# Patient Record
Sex: MALE | Race: BLACK OR AFRICAN AMERICAN | NOT HISPANIC OR LATINO | URBAN - METROPOLITAN AREA
[De-identification: names, ages, dates, MRNs, and addresses within clinical notes are randomized per-mention and may not be internally consistent; named-entity substitution may affect disease eponyms.]

---

## 2018-05-29 ENCOUNTER — OUTPATIENT (OUTPATIENT)
Dept: OUTPATIENT SERVICES | Facility: HOSPITAL | Age: 83
LOS: 1 days | End: 2018-05-29
Payer: MEDICARE

## 2018-05-29 DIAGNOSIS — R06.09 OTHER FORMS OF DYSPNEA: ICD-10-CM

## 2018-05-29 PROCEDURE — 78452 HT MUSCLE IMAGE SPECT MULT: CPT

## 2018-05-29 PROCEDURE — A9502: CPT

## 2018-05-29 PROCEDURE — 93017 CV STRESS TEST TRACING ONLY: CPT

## 2018-06-21 ENCOUNTER — OUTPATIENT (OUTPATIENT)
Dept: OUTPATIENT SERVICES | Facility: HOSPITAL | Age: 83
LOS: 1 days | End: 2018-06-21
Payer: MEDICARE

## 2018-06-21 DIAGNOSIS — I35.9 NONRHEUMATIC AORTIC VALVE DISORDER, UNSPECIFIED: ICD-10-CM

## 2018-06-21 PROCEDURE — 93320 DOPPLER ECHO COMPLETE: CPT | Mod: 26

## 2018-06-21 PROCEDURE — 93312 ECHO TRANSESOPHAGEAL: CPT | Mod: 26

## 2018-06-21 PROCEDURE — 93312 ECHO TRANSESOPHAGEAL: CPT

## 2018-06-21 PROCEDURE — 93325 DOPPLER ECHO COLOR FLOW MAPG: CPT | Mod: 26

## 2018-06-21 PROCEDURE — 93320 DOPPLER ECHO COMPLETE: CPT

## 2018-06-21 PROCEDURE — 82962 GLUCOSE BLOOD TEST: CPT

## 2018-06-21 PROCEDURE — 93325 DOPPLER ECHO COLOR FLOW MAPG: CPT

## 2019-04-15 PROBLEM — Z00.00 ENCOUNTER FOR PREVENTIVE HEALTH EXAMINATION: Status: ACTIVE | Noted: 2019-04-15

## 2019-04-22 ENCOUNTER — APPOINTMENT (OUTPATIENT)
Dept: UROLOGY | Facility: CLINIC | Age: 84
End: 2019-04-22

## 2019-06-12 ENCOUNTER — APPOINTMENT (OUTPATIENT)
Dept: UROLOGY | Facility: CLINIC | Age: 84
End: 2019-06-12
Payer: MEDICARE

## 2019-06-12 VITALS
WEIGHT: 277 LBS | DIASTOLIC BLOOD PRESSURE: 66 MMHG | HEART RATE: 95 BPM | SYSTOLIC BLOOD PRESSURE: 124 MMHG | BODY MASS INDEX: 41.03 KG/M2 | HEIGHT: 69 IN | RESPIRATION RATE: 18 BRPM

## 2019-06-12 DIAGNOSIS — R97.20 ELEVATED PROSTATE, SPECIFIC ANTIGEN [PSA]: ICD-10-CM

## 2019-06-12 DIAGNOSIS — Z86.39 PERSONAL HISTORY OF OTHER ENDOCRINE, NUTRITIONAL AND METABOLIC DISEASE: ICD-10-CM

## 2019-06-12 DIAGNOSIS — Z87.891 PERSONAL HISTORY OF NICOTINE DEPENDENCE: ICD-10-CM

## 2019-06-12 DIAGNOSIS — Z86.79 PERSONAL HISTORY OF OTHER DISEASES OF THE CIRCULATORY SYSTEM: ICD-10-CM

## 2019-06-12 PROCEDURE — 99204 OFFICE O/P NEW MOD 45 MIN: CPT

## 2019-06-26 PROBLEM — R97.20 ELEVATED PSA: Status: ACTIVE | Noted: 2019-06-26

## 2019-07-22 PROBLEM — Z87.891 FORMER SMOKER: Status: ACTIVE | Noted: 2019-07-22

## 2019-07-22 PROBLEM — Z86.79 HISTORY OF HYPERTENSION: Status: RESOLVED | Noted: 2019-07-22 | Resolved: 2019-07-22

## 2019-07-22 PROBLEM — Z86.39 HISTORY OF DIABETES MELLITUS: Status: RESOLVED | Noted: 2019-07-22 | Resolved: 2019-07-22

## 2019-07-22 NOTE — PHYSICAL EXAM
[General Appearance - Well Nourished] : well nourished [General Appearance - Well Developed] : well developed [Normal Appearance] : normal appearance [Well Groomed] : well groomed [General Appearance - In No Acute Distress] : no acute distress [Edema] : no peripheral edema [Respiration, Rhythm And Depth] : normal respiratory rhythm and effort [Exaggerated Use Of Accessory Muscles For Inspiration] : no accessory muscle use [Abdomen Soft] : soft [Abdomen Tenderness] : non-tender [Costovertebral Angle Tenderness] : no ~M costovertebral angle tenderness [Urethral Meatus] : meatus normal [Urinary Bladder Findings] : the bladder was normal on palpation [Scrotum] : the scrotum was normal [Testes Mass (___cm)] : there were no testicular masses [No Prostate Nodules] : no prostate nodules [Normal Station and Gait] : the gait and station were normal for the patient's age [] : no rash [No Focal Deficits] : no focal deficits [Oriented To Time, Place, And Person] : oriented to person, place, and time [Affect] : the affect was normal [Mood] : the mood was normal [Not Anxious] : not anxious [No Palpable Adenopathy] : no palpable adenopathy

## 2019-07-22 NOTE — ASSESSMENT
[FreeTextEntry1] : We had a long discussion regarding PSA level. Different parameters including PSA velocity, age-adjusted PSA, free PSA, etc reviewed. Some studies have indicated that the absolute PSA level may be more accurate than the other parameters listed above. We discussed observation and repeat PSA, prostate biopsy and prostate or pelvic MRI. The false negative rate of MRI was discussed. Risks of biopsy discussed included but were not limited to bleeding, sepsis, bacteremia, urinary tract infection, erectile dysfunction, etc. How the procedure is performed and preparation with antibiotics and enema were discussed. Risks of observation and not proceeding with biopsy were reviewed. Patient was made aware that prostate cancer can exist at any PSA level. Potential complications of delayed prostate cancer diagnosis were discussed.\par given his age and relative stability will observe psa\par

## 2019-07-22 NOTE — HISTORY OF PRESENT ILLNESS
[FreeTextEntry1] : Elevated PSA \par Patient is here with an elevated PSA. He has no personal history and no family history of prostate cancer.He has no prior genitourinary history of hematuria, hematospermia, prostatitis, UTI, erectile dysfunction, urolithiasis, epididymal orchitis. \par No dysuria or hematuria\par \par \par Units	1mo ago\par (6/4/19)	5mo ago\par (2/5/19)	1yr ago\par (11/8/17)	2yr ago\par (7/3/17)	4yr ago\par (3/25/15)	5yr ago\par (3/25/14)	5yr ago\par (10/22/13)\par PSA, Total Screen, Medicare	<=4.0 ng/mL	4.2 Abnormally high	4.4 Abnormally high CM	3.3 CM	3.4 CM	3.0 CM	3.9 CM	4.9 Abnormally high\par

## 2019-07-22 NOTE — PHYSICAL EXAM
[General Appearance - Well Developed] : well developed [General Appearance - Well Nourished] : well nourished [Normal Appearance] : normal appearance [Well Groomed] : well groomed [Edema] : no peripheral edema [General Appearance - In No Acute Distress] : no acute distress [Respiration, Rhythm And Depth] : normal respiratory rhythm and effort [Exaggerated Use Of Accessory Muscles For Inspiration] : no accessory muscle use [Abdomen Soft] : soft [Abdomen Tenderness] : non-tender [Costovertebral Angle Tenderness] : no ~M costovertebral angle tenderness [Urinary Bladder Findings] : the bladder was normal on palpation [Urethral Meatus] : meatus normal [Scrotum] : the scrotum was normal [Testes Mass (___cm)] : there were no testicular masses [No Prostate Nodules] : no prostate nodules [Normal Station and Gait] : the gait and station were normal for the patient's age [] : no rash [No Focal Deficits] : no focal deficits [Oriented To Time, Place, And Person] : oriented to person, place, and time [Mood] : the mood was normal [Affect] : the affect was normal [Not Anxious] : not anxious [No Palpable Adenopathy] : no palpable adenopathy

## 2020-02-28 DIAGNOSIS — Z86.39 PERSONAL HISTORY OF OTHER ENDOCRINE, NUTRITIONAL AND METABOLIC DISEASE: ICD-10-CM

## 2020-02-28 DIAGNOSIS — Z86.79 PERSONAL HISTORY OF OTHER DISEASES OF THE CIRCULATORY SYSTEM: ICD-10-CM

## 2020-02-28 RX ORDER — GLIPIZIDE 2.5 MG/1
TABLET ORAL
Refills: 0 | Status: COMPLETED | COMMUNITY
End: 2020-02-28

## 2020-02-28 RX ORDER — GLIPIZIDE 5 MG/1
5 TABLET ORAL DAILY
Refills: 0 | Status: ACTIVE | COMMUNITY

## 2020-02-28 RX ORDER — CYANOCOBALAMIN (VITAMIN B-12) 1000 MCG
1000 TABLET ORAL DAILY
Refills: 0 | Status: ACTIVE | COMMUNITY

## 2020-02-28 RX ORDER — FUROSEMIDE 40 MG/1
40 TABLET ORAL DAILY
Refills: 0 | Status: ACTIVE | COMMUNITY

## 2020-02-28 RX ORDER — DORZOLAMIDE HYDROCHLORIDE 20 MG/ML
2 SOLUTION OPHTHALMIC TWICE DAILY
Refills: 0 | Status: ACTIVE | COMMUNITY

## 2020-03-04 ENCOUNTER — APPOINTMENT (OUTPATIENT)
Dept: CARDIOTHORACIC SURGERY | Facility: CLINIC | Age: 85
End: 2020-03-04
Payer: MEDICARE

## 2020-03-04 VITALS
HEIGHT: 69 IN | TEMPERATURE: 98.9 F | BODY MASS INDEX: 40.88 KG/M2 | DIASTOLIC BLOOD PRESSURE: 74 MMHG | WEIGHT: 276 LBS | RESPIRATION RATE: 18 BRPM | OXYGEN SATURATION: 97 % | HEART RATE: 97 BPM | SYSTOLIC BLOOD PRESSURE: 155 MMHG

## 2020-03-04 DIAGNOSIS — I35.0 NONRHEUMATIC AORTIC (VALVE) STENOSIS: ICD-10-CM

## 2020-03-04 PROCEDURE — 99205 OFFICE O/P NEW HI 60 MIN: CPT

## 2020-03-05 NOTE — PHYSICAL EXAM
[General Appearance - Alert] : alert [General Appearance - In No Acute Distress] : in no acute distress [Sclera] : the sclera and conjunctiva were normal [Neck Appearance] : the appearance of the neck was normal [] : no respiratory distress [Apical Impulse] : the apical impulse was normal [Heart Rate And Rhythm] : heart rate was normal and rhythm regular [2+] : left 2+ [Abdomen Soft] : soft [Cervical Lymph Nodes Enlarged Posterior Bilaterally] : posterior cervical [Abnormal Walk] : normal gait [Skin Color & Pigmentation] : normal skin color and pigmentation [Skin Turgor] : normal skin turgor [Deep Tendon Reflexes (DTR)] : deep tendon reflexes were 2+ and symmetric [Oriented To Time, Place, And Person] : oriented to person, place, and time

## 2020-03-05 NOTE — REVIEW OF SYSTEMS
[Feeling Fatigued] : feeling fatigued [see HPI] : see HPI [Shortness Of Breath] : shortness of breath [Dyspnea on exertion] : dyspnea during exertion [Negative] : Heme/Lymph

## 2020-03-11 NOTE — HISTORY OF PRESENT ILLNESS
[FreeTextEntry1] : 84 year old former smoking male with a history of hypertension, hyperlipidemia, DM (on oral medications), chronic diastolic heart failure with moderate to severe aortic stenosis who was referred for further evaluation of his valvular disease. \par \par The patient states for the last six months, he has been experiencing worsening dyspnea with exertion walking a few blocks or a flight of stairs. He has also noticed himself slowing down. He denies any SOB at rest, chest pain, palpitations, dizziness, syncope, or LE edema. \par \par The patient underwent an ECHO on 02/14/2020 which showed a heavily calcified aortic valve with mean gradient 35 mmHg. \par \par The patient lives with his wife in a home.  There are stairs in the house which the patient has to climb slowly. He remains independent in all his ADLs and continues to drive.

## 2020-03-11 NOTE — PHYSICAL EXAM
[General Appearance - In No Acute Distress] : no acute distress [Normal Conjunctiva] : the conjunctiva exhibited no abnormalities [Normal Oral Mucosa] : normal oral mucosa [Normal Jugular Venous A Waves Present] : normal jugular venous A waves present [Normal Jugular Venous V Waves Present] : normal jugular venous V waves present [Heart Rate And Rhythm] : heart rate and rhythm were normal [Edema] : no peripheral edema present [] : no respiratory distress [Respiration, Rhythm And Depth] : normal respiratory rhythm and effort [Exaggerated Use Of Accessory Muscles For Inspiration] : no accessory muscle use [Abnormal Walk] : normal gait [Cyanosis, Localized] : no localized cyanosis [Oriented To Time, Place, And Person] : oriented to person, place, and time [FreeTextEntry1] : + III/VI systolic ejection murmur

## 2020-03-15 ENCOUNTER — APPOINTMENT (OUTPATIENT)
Dept: CARDIOTHORACIC SURGERY | Facility: CLINIC | Age: 85
End: 2020-03-15
Payer: MEDICARE

## 2020-03-15 PROCEDURE — G2012 BRIEF CHECK IN BY MD/QHP: CPT

## 2020-03-16 NOTE — ASSESSMENT
[FreeTextEntry1] : \par \par 84 year old former smoking male with a history of hypertension, hyperlipidemia, DM (on oral medications), chronic diastolic heart failure with moderate to severe aortic stenosis who was referred for further evaluation of his valvular disease. The patient is clinically stable, NYHA Class III. The ECHO was reviewed by Dr. Kwok and Dr. Cabrera which showed a heavily calcified aortic valve consistent with severe AS with mean gradient of 35 mmHg. Given the patient's symptoms and abnormal ECHO, Dr. Kwok and Dr. Cabrera recommend invasive measurement of the aortic valve as well as [facility] to assess the coronary arteries. If severe AS is confirmed, the patient will have all preTAVR testing including TAVR CTs.  The plan was discussed with the patient and he agrees to proceed. All questions answered. \par \par \par Dr. Heaton reviewed the diagnostic images with the patient and discussed the case with Dr. Cabrera. Dr. Heaton discussed the indications for treatment. Given the patient's symptoms of WHEAT and severe aortic stenosis, the patient meets criteria for intervention. \par \par \par  \par \par  \par Plan\par

## 2020-03-16 NOTE — HISTORY OF PRESENT ILLNESS
[FreeTextEntry1] : medications), chronic diastolic heart failure with moderate to severe aortic stenosis who was referred for further evaluation of his valvular disease. \par \par The patient states for the last six months, he has been experiencing worsening dyspnea with exertion walking a few blocks or a flight of stairs. He has also noticed himself slowing down. He denies any SOB at rest, chest pain, palpitations, dizziness, syncope, or LE edema. \par \par The patient underwent an ECHO on 02/14/2020 which showed a heavily calcified aortic valve with mean gradient 35 mmHg. \par \par The patient lives with his wife in a home. There are stairs in the house which the patient has to climb slowly. He remains independent in all his ADLs and continues to drive. \par  \par

## 2020-03-16 NOTE — END OF VISIT
[FreeTextEntry3] : I, LARRY JAY , am scribing for and in the presence of JOAQUIN HILL the following sections: History of present illness, past Medical/family/surgical/family/social history, review of systems, vital signs, physical exam and disposition.\par \par I personally performed the services described in the documentation, reviewed the documentation recorded by the scribe in my presence and it accurately and completely records my words and actions.\par \par

## 2020-03-16 NOTE — DATA REVIEWED
[FreeTextEntry1] : Echocardiogram 2/14/20: EF 60%, calcified trileaflet aortic valve with reduced opening, CATERINA 0.8cm2, mean gradient 35mmHg, peak gradient 58mmHg, suggestive of severe AS. \par \par

## 2020-03-16 NOTE — CONSULT LETTER
[Dear  ___] : Dear  [unfilled], [Please see my note below.] : Please see my note below. [Sincerely,] : Sincerely, [FreeTextEntry2] : Erin Moody MD\par [address] [facility], [place] [address]\par  [FreeTextEntry1] : I had the pleasure of seeing your patient, SHAHRAM LÓPEZ, in my office today. \par \par We take a multidisciplinary team approach to patient care and consider you, the referring physician, an extension of our team. We will maintain an open line of communication with you throughout your patient's treatment course.  \par \par SHAHRAM LÓPEZ  is being evaluated for severe aortic stenosis. I have reviewed all of the patient's medical records and diagnostic images at the time of his office consultation. I have enclosed a copy for your records. \par \par I have reviewed the indications for surgery,and used our webpage [email or web address] <[email or web address]> to illustrate the aorta and anatomy of the heart. The patient meets criteria for surgery. I have recommended that the patient is a candidate for a Transcatheter Aortic Valve Replacement.\par \par I appreciate the opportunity to care for your patient at the [facility] based at [facility]. If there are any questions or concerns, please call me directly at [phone]. \par \par \par  [FreeTextEntry3] : \par Keaton Kwok M.D.\par Professor of Cardiovascular and Thoracic Surgery\par Minimally Invasive Valve Surgeon\par Director of Aortic Surgery, [facility]\par Cell: [phone]\par Email: [email or web address] \par \par [facility]:\par [address], [address] Floor, [place] [address]\par Office: [phone]\par Fax: [phone]\par \par [facility]:\par Department of Cardiovascular and Thoracic Surgery\par [address] [place] [address], [address]\par Office: [phone]\par Fax: [phone]\par \par Practice Manager: Ms. Harriett Abdi\par Email: [email or web address]\par Phone: [phone]\par \par

## 2020-03-17 ENCOUNTER — APPOINTMENT (OUTPATIENT)
Dept: CARDIOTHORACIC SURGERY | Facility: HOSPITAL | Age: 85
End: 2020-03-17

## 2020-04-27 ENCOUNTER — APPOINTMENT (OUTPATIENT)
Dept: CARDIOTHORACIC SURGERY | Facility: CLINIC | Age: 85
End: 2020-04-27
Payer: MEDICARE

## 2020-04-27 PROCEDURE — 99443: CPT

## 2020-05-14 PROBLEM — I35.0 SEVERE AORTIC STENOSIS: Status: ACTIVE | Noted: 2020-03-05

## 2020-06-02 ENCOUNTER — LABORATORY RESULT (OUTPATIENT)
Age: 85
End: 2020-06-02

## 2020-06-03 ENCOUNTER — FORM ENCOUNTER (OUTPATIENT)
Age: 85
End: 2020-06-03

## 2020-06-03 VITALS
SYSTOLIC BLOOD PRESSURE: 144 MMHG | HEART RATE: 96 BPM | DIASTOLIC BLOOD PRESSURE: 66 MMHG | RESPIRATION RATE: 18 BRPM | HEIGHT: 69 IN | WEIGHT: 279.99 LBS | OXYGEN SATURATION: 94 %

## 2020-06-04 ENCOUNTER — OUTPATIENT (OUTPATIENT)
Dept: OUTPATIENT SERVICES | Facility: HOSPITAL | Age: 85
LOS: 1 days | End: 2020-06-04
Payer: MEDICARE

## 2020-06-04 ENCOUNTER — APPOINTMENT (OUTPATIENT)
Dept: CARDIOTHORACIC SURGERY | Facility: HOSPITAL | Age: 85
End: 2020-06-04

## 2020-06-04 DIAGNOSIS — I35.0 NONRHEUMATIC AORTIC (VALVE) STENOSIS: ICD-10-CM

## 2020-06-04 LAB
ANION GAP SERPL CALC-SCNC: 13 MMOL/L — SIGNIFICANT CHANGE UP (ref 5–17)
APTT BLD: 33 SEC — SIGNIFICANT CHANGE UP (ref 27.5–36.3)
BLD GP AB SCN SERPL QL: NEGATIVE — SIGNIFICANT CHANGE UP
BUN SERPL-MCNC: 25 MG/DL — HIGH (ref 7–23)
CALCIUM SERPL-MCNC: 9.2 MG/DL — SIGNIFICANT CHANGE UP (ref 8.4–10.5)
CHLORIDE SERPL-SCNC: 107 MMOL/L — SIGNIFICANT CHANGE UP (ref 96–108)
CO2 SERPL-SCNC: 23 MMOL/L — SIGNIFICANT CHANGE UP (ref 22–31)
CREAT SERPL-MCNC: 1.45 MG/DL — HIGH (ref 0.5–1.3)
GLUCOSE BLDC GLUCOMTR-MCNC: 136 MG/DL — HIGH (ref 70–99)
GLUCOSE SERPL-MCNC: 137 MG/DL — HIGH (ref 70–99)
HCT VFR BLD CALC: 33.5 % — LOW (ref 39–50)
HGB BLD-MCNC: 10.8 G/DL — LOW (ref 13–17)
INR BLD: 1.15 — SIGNIFICANT CHANGE UP (ref 0.88–1.16)
MAGNESIUM SERPL-MCNC: 2 MG/DL — SIGNIFICANT CHANGE UP (ref 1.6–2.6)
MCHC RBC-ENTMCNC: 26.3 PG — LOW (ref 27–34)
MCHC RBC-ENTMCNC: 32.2 GM/DL — SIGNIFICANT CHANGE UP (ref 32–36)
MCV RBC AUTO: 81.5 FL — SIGNIFICANT CHANGE UP (ref 80–100)
NRBC # BLD: 0 /100 WBCS — SIGNIFICANT CHANGE UP (ref 0–0)
PLATELET # BLD AUTO: 227 K/UL — SIGNIFICANT CHANGE UP (ref 150–400)
POTASSIUM SERPL-MCNC: 4 MMOL/L — SIGNIFICANT CHANGE UP (ref 3.5–5.3)
POTASSIUM SERPL-SCNC: 4 MMOL/L — SIGNIFICANT CHANGE UP (ref 3.5–5.3)
PROTHROM AB SERPL-ACNC: 13.2 SEC — HIGH (ref 10–12.9)
RBC # BLD: 4.11 M/UL — LOW (ref 4.2–5.8)
RBC # FLD: 14.8 % — HIGH (ref 10.3–14.5)
RH IG SCN BLD-IMP: POSITIVE — SIGNIFICANT CHANGE UP
SODIUM SERPL-SCNC: 143 MMOL/L — SIGNIFICANT CHANGE UP (ref 135–145)
WBC # BLD: 9.18 K/UL — SIGNIFICANT CHANGE UP (ref 3.8–10.5)
WBC # FLD AUTO: 9.18 K/UL — SIGNIFICANT CHANGE UP (ref 3.8–10.5)

## 2020-06-04 PROCEDURE — 70450 CT HEAD/BRAIN W/O DYE: CPT

## 2020-06-04 PROCEDURE — 86901 BLOOD TYPING SEROLOGIC RH(D): CPT

## 2020-06-04 PROCEDURE — 75572 CT HRT W/3D IMAGE: CPT

## 2020-06-04 PROCEDURE — 85027 COMPLETE CBC AUTOMATED: CPT

## 2020-06-04 PROCEDURE — 36415 COLL VENOUS BLD VENIPUNCTURE: CPT

## 2020-06-04 PROCEDURE — 93306 TTE W/DOPPLER COMPLETE: CPT

## 2020-06-04 PROCEDURE — 80048 BASIC METABOLIC PNL TOTAL CA: CPT

## 2020-06-04 PROCEDURE — 86850 RBC ANTIBODY SCREEN: CPT

## 2020-06-04 PROCEDURE — 83735 ASSAY OF MAGNESIUM: CPT

## 2020-06-04 PROCEDURE — 75572 CT HRT W/3D IMAGE: CPT | Mod: 26

## 2020-06-04 PROCEDURE — 70450 CT HEAD/BRAIN W/O DYE: CPT | Mod: 26

## 2020-06-04 PROCEDURE — 85610 PROTHROMBIN TIME: CPT

## 2020-06-04 PROCEDURE — 93306 TTE W/DOPPLER COMPLETE: CPT | Mod: 26

## 2020-06-04 PROCEDURE — 85730 THROMBOPLASTIN TIME PARTIAL: CPT

## 2020-06-04 PROCEDURE — 82962 GLUCOSE BLOOD TEST: CPT

## 2020-06-04 PROCEDURE — 74174 CTA ABD&PLVS W/CONTRAST: CPT

## 2020-06-04 PROCEDURE — 74174 CTA ABD&PLVS W/CONTRAST: CPT | Mod: 26

## 2020-06-04 RX ORDER — ASPIRIN/CALCIUM CARB/MAGNESIUM 324 MG
325 TABLET ORAL ONCE
Refills: 0 | Status: DISCONTINUED | OUTPATIENT
Start: 2020-06-04 | End: 2020-06-19

## 2020-06-04 RX ORDER — CLOPIDOGREL BISULFATE 75 MG/1
300 TABLET, FILM COATED ORAL ONCE
Refills: 0 | Status: DISCONTINUED | OUTPATIENT
Start: 2020-06-04 | End: 2020-06-19

## 2020-06-08 DIAGNOSIS — N18.3 CHRONIC KIDNEY DISEASE, STAGE 3 (MODERATE): ICD-10-CM

## 2020-06-09 ENCOUNTER — LABORATORY RESULT (OUTPATIENT)
Age: 85
End: 2020-06-09

## 2020-06-10 ENCOUNTER — FORM ENCOUNTER (OUTPATIENT)
Age: 85
End: 2020-06-10

## 2020-06-10 VITALS
OXYGEN SATURATION: 96 % | SYSTOLIC BLOOD PRESSURE: 163 MMHG | TEMPERATURE: 98 F | WEIGHT: 274.92 LBS | RESPIRATION RATE: 17 BRPM | HEIGHT: 69 IN | DIASTOLIC BLOOD PRESSURE: 79 MMHG | HEART RATE: 85 BPM

## 2020-06-11 ENCOUNTER — APPOINTMENT (OUTPATIENT)
Dept: CARDIOTHORACIC SURGERY | Facility: CLINIC | Age: 85
End: 2020-06-11
Payer: MEDICARE

## 2020-06-11 ENCOUNTER — APPOINTMENT (OUTPATIENT)
Dept: CARDIOTHORACIC SURGERY | Facility: HOSPITAL | Age: 85
End: 2020-06-11

## 2020-06-11 ENCOUNTER — INPATIENT (INPATIENT)
Facility: HOSPITAL | Age: 85
LOS: 0 days | Discharge: HOME | DRG: 267 | End: 2020-06-12
Attending: THORACIC SURGERY (CARDIOTHORACIC VASCULAR SURGERY) | Admitting: THORACIC SURGERY (CARDIOTHORACIC VASCULAR SURGERY)
Payer: MEDICARE

## 2020-06-11 LAB
ALBUMIN SERPL ELPH-MCNC: 3.3 G/DL — SIGNIFICANT CHANGE UP (ref 3.3–5)
ALBUMIN SERPL ELPH-MCNC: 3.3 G/DL — SIGNIFICANT CHANGE UP (ref 3.3–5)
ALP SERPL-CCNC: 85 U/L — SIGNIFICANT CHANGE UP (ref 40–120)
ALP SERPL-CCNC: 96 U/L — SIGNIFICANT CHANGE UP (ref 40–120)
ALT FLD-CCNC: 18 U/L — SIGNIFICANT CHANGE UP (ref 10–45)
ALT FLD-CCNC: 20 U/L — SIGNIFICANT CHANGE UP (ref 10–45)
ANION GAP SERPL CALC-SCNC: 11 MMOL/L — SIGNIFICANT CHANGE UP (ref 5–17)
ANION GAP SERPL CALC-SCNC: 14 MMOL/L — SIGNIFICANT CHANGE UP (ref 5–17)
ANION GAP SERPL CALC-SCNC: 9 MMOL/L — SIGNIFICANT CHANGE UP (ref 5–17)
APTT BLD: 34.9 SEC — SIGNIFICANT CHANGE UP (ref 27.5–36.3)
APTT BLD: 35 SEC — SIGNIFICANT CHANGE UP (ref 27.5–36.3)
APTT BLD: 35.4 SEC — SIGNIFICANT CHANGE UP (ref 27.5–36.3)
AST SERPL-CCNC: 26 U/L — SIGNIFICANT CHANGE UP (ref 10–40)
AST SERPL-CCNC: 28 U/L — SIGNIFICANT CHANGE UP (ref 10–40)
BASOPHILS # BLD AUTO: 0.04 K/UL — SIGNIFICANT CHANGE UP (ref 0–0.2)
BASOPHILS NFR BLD AUTO: 0.4 % — SIGNIFICANT CHANGE UP (ref 0–2)
BILIRUB SERPL-MCNC: 0.4 MG/DL — SIGNIFICANT CHANGE UP (ref 0.2–1.2)
BILIRUB SERPL-MCNC: 0.4 MG/DL — SIGNIFICANT CHANGE UP (ref 0.2–1.2)
BUN SERPL-MCNC: 19 MG/DL — SIGNIFICANT CHANGE UP (ref 7–23)
BUN SERPL-MCNC: 22 MG/DL — SIGNIFICANT CHANGE UP (ref 7–23)
BUN SERPL-MCNC: 25 MG/DL — HIGH (ref 7–23)
CALCIUM SERPL-MCNC: 8.8 MG/DL — SIGNIFICANT CHANGE UP (ref 8.4–10.5)
CALCIUM SERPL-MCNC: 9 MG/DL — SIGNIFICANT CHANGE UP (ref 8.4–10.5)
CALCIUM SERPL-MCNC: 9.5 MG/DL — SIGNIFICANT CHANGE UP (ref 8.4–10.5)
CHLORIDE SERPL-SCNC: 108 MMOL/L — SIGNIFICANT CHANGE UP (ref 96–108)
CHLORIDE SERPL-SCNC: 108 MMOL/L — SIGNIFICANT CHANGE UP (ref 96–108)
CHLORIDE SERPL-SCNC: 109 MMOL/L — HIGH (ref 96–108)
CO2 SERPL-SCNC: 21 MMOL/L — LOW (ref 22–31)
CO2 SERPL-SCNC: 21 MMOL/L — LOW (ref 22–31)
CO2 SERPL-SCNC: 22 MMOL/L — SIGNIFICANT CHANGE UP (ref 22–31)
CREAT SERPL-MCNC: 1.2 MG/DL — SIGNIFICANT CHANGE UP (ref 0.5–1.3)
CREAT SERPL-MCNC: 1.26 MG/DL — SIGNIFICANT CHANGE UP (ref 0.5–1.3)
CREAT SERPL-MCNC: 1.43 MG/DL — HIGH (ref 0.5–1.3)
EOSINOPHIL # BLD AUTO: 0.13 K/UL — SIGNIFICANT CHANGE UP (ref 0–0.5)
EOSINOPHIL NFR BLD AUTO: 1.2 % — SIGNIFICANT CHANGE UP (ref 0–6)
GAS PNL BLDA: SIGNIFICANT CHANGE UP
GAS PNL BLDA: SIGNIFICANT CHANGE UP
GLUCOSE BLDC GLUCOMTR-MCNC: 124 MG/DL — HIGH (ref 70–99)
GLUCOSE BLDC GLUCOMTR-MCNC: 145 MG/DL — HIGH (ref 70–99)
GLUCOSE BLDC GLUCOMTR-MCNC: 145 MG/DL — HIGH (ref 70–99)
GLUCOSE SERPL-MCNC: 129 MG/DL — HIGH (ref 70–99)
GLUCOSE SERPL-MCNC: 145 MG/DL — HIGH (ref 70–99)
GLUCOSE SERPL-MCNC: 160 MG/DL — HIGH (ref 70–99)
HCT VFR BLD CALC: 32.4 % — LOW (ref 39–50)
HCT VFR BLD CALC: 35.3 % — LOW (ref 39–50)
HCT VFR BLD CALC: 37.1 % — LOW (ref 39–50)
HGB BLD-MCNC: 10.5 G/DL — LOW (ref 13–17)
HGB BLD-MCNC: 11.4 G/DL — LOW (ref 13–17)
HGB BLD-MCNC: 11.7 G/DL — LOW (ref 13–17)
IMM GRANULOCYTES NFR BLD AUTO: 0.7 % — SIGNIFICANT CHANGE UP (ref 0–1.5)
INR BLD: 1.09 — SIGNIFICANT CHANGE UP (ref 0.88–1.16)
INR BLD: 1.16 — SIGNIFICANT CHANGE UP (ref 0.88–1.16)
INR BLD: 1.19 — HIGH (ref 0.88–1.16)
LYMPHOCYTES # BLD AUTO: 1.3 K/UL — SIGNIFICANT CHANGE UP (ref 1–3.3)
LYMPHOCYTES # BLD AUTO: 12.3 % — LOW (ref 13–44)
MAGNESIUM SERPL-MCNC: 2 MG/DL — SIGNIFICANT CHANGE UP (ref 1.6–2.6)
MAGNESIUM SERPL-MCNC: 2 MG/DL — SIGNIFICANT CHANGE UP (ref 1.6–2.6)
MAGNESIUM SERPL-MCNC: 2.2 MG/DL — SIGNIFICANT CHANGE UP (ref 1.6–2.6)
MCHC RBC-ENTMCNC: 26.1 PG — LOW (ref 27–34)
MCHC RBC-ENTMCNC: 26.2 PG — LOW (ref 27–34)
MCHC RBC-ENTMCNC: 26.6 PG — LOW (ref 27–34)
MCHC RBC-ENTMCNC: 31.5 GM/DL — LOW (ref 32–36)
MCHC RBC-ENTMCNC: 32.3 GM/DL — SIGNIFICANT CHANGE UP (ref 32–36)
MCHC RBC-ENTMCNC: 32.4 GM/DL — SIGNIFICANT CHANGE UP (ref 32–36)
MCV RBC AUTO: 81.1 FL — SIGNIFICANT CHANGE UP (ref 80–100)
MCV RBC AUTO: 82 FL — SIGNIFICANT CHANGE UP (ref 80–100)
MCV RBC AUTO: 82.6 FL — SIGNIFICANT CHANGE UP (ref 80–100)
MONOCYTES # BLD AUTO: 0.42 K/UL — SIGNIFICANT CHANGE UP (ref 0–0.9)
MONOCYTES NFR BLD AUTO: 4 % — SIGNIFICANT CHANGE UP (ref 2–14)
NEUTROPHILS # BLD AUTO: 8.64 K/UL — HIGH (ref 1.8–7.4)
NEUTROPHILS NFR BLD AUTO: 81.4 % — HIGH (ref 43–77)
NRBC # BLD: 0 /100 WBCS — SIGNIFICANT CHANGE UP (ref 0–0)
PHOSPHATE SERPL-MCNC: 3.1 MG/DL — SIGNIFICANT CHANGE UP (ref 2.5–4.5)
PHOSPHATE SERPL-MCNC: 3.6 MG/DL — SIGNIFICANT CHANGE UP (ref 2.5–4.5)
PHOSPHATE SERPL-MCNC: 3.6 MG/DL — SIGNIFICANT CHANGE UP (ref 2.5–4.5)
PLATELET # BLD AUTO: 247 K/UL — SIGNIFICANT CHANGE UP (ref 150–400)
PLATELET # BLD AUTO: 267 K/UL — SIGNIFICANT CHANGE UP (ref 150–400)
PLATELET # BLD AUTO: 323 K/UL — SIGNIFICANT CHANGE UP (ref 150–400)
POTASSIUM SERPL-MCNC: 4.2 MMOL/L — SIGNIFICANT CHANGE UP (ref 3.5–5.3)
POTASSIUM SERPL-MCNC: 4.3 MMOL/L — SIGNIFICANT CHANGE UP (ref 3.5–5.3)
POTASSIUM SERPL-MCNC: 4.4 MMOL/L — SIGNIFICANT CHANGE UP (ref 3.5–5.3)
POTASSIUM SERPL-SCNC: 4.2 MMOL/L — SIGNIFICANT CHANGE UP (ref 3.5–5.3)
POTASSIUM SERPL-SCNC: 4.3 MMOL/L — SIGNIFICANT CHANGE UP (ref 3.5–5.3)
POTASSIUM SERPL-SCNC: 4.4 MMOL/L — SIGNIFICANT CHANGE UP (ref 3.5–5.3)
PROT SERPL-MCNC: 6.8 G/DL — SIGNIFICANT CHANGE UP (ref 6–8.3)
PROT SERPL-MCNC: 7.3 G/DL — SIGNIFICANT CHANGE UP (ref 6–8.3)
PROTHROM AB SERPL-ACNC: 12.5 SEC — SIGNIFICANT CHANGE UP (ref 10–12.9)
PROTHROM AB SERPL-ACNC: 13.3 SEC — HIGH (ref 10–12.9)
PROTHROM AB SERPL-ACNC: 13.6 SEC — HIGH (ref 10–12.9)
RBC # BLD: 3.95 M/UL — LOW (ref 4.2–5.8)
RBC # BLD: 4.35 M/UL — SIGNIFICANT CHANGE UP (ref 4.2–5.8)
RBC # BLD: 4.49 M/UL — SIGNIFICANT CHANGE UP (ref 4.2–5.8)
RBC # FLD: 14.6 % — HIGH (ref 10.3–14.5)
RBC # FLD: 14.8 % — HIGH (ref 10.3–14.5)
RBC # FLD: 14.8 % — HIGH (ref 10.3–14.5)
SODIUM SERPL-SCNC: 139 MMOL/L — SIGNIFICANT CHANGE UP (ref 135–145)
SODIUM SERPL-SCNC: 140 MMOL/L — SIGNIFICANT CHANGE UP (ref 135–145)
SODIUM SERPL-SCNC: 144 MMOL/L — SIGNIFICANT CHANGE UP (ref 135–145)
WBC # BLD: 10.6 K/UL — HIGH (ref 3.8–10.5)
WBC # BLD: 8.25 K/UL — SIGNIFICANT CHANGE UP (ref 3.8–10.5)
WBC # BLD: 9.43 K/UL — SIGNIFICANT CHANGE UP (ref 3.8–10.5)
WBC # FLD AUTO: 10.6 K/UL — HIGH (ref 3.8–10.5)
WBC # FLD AUTO: 8.25 K/UL — SIGNIFICANT CHANGE UP (ref 3.8–10.5)
WBC # FLD AUTO: 9.43 K/UL — SIGNIFICANT CHANGE UP (ref 3.8–10.5)

## 2020-06-11 PROCEDURE — 93458 L HRT ARTERY/VENTRICLE ANGIO: CPT | Mod: 26,59

## 2020-06-11 PROCEDURE — 93355 ECHO TRANSESOPHAGEAL (TEE): CPT | Mod: 26

## 2020-06-11 PROCEDURE — G2012 BRIEF CHECK IN BY MD/QHP: CPT

## 2020-06-11 PROCEDURE — 99203 OFFICE O/P NEW LOW 30 MIN: CPT

## 2020-06-11 PROCEDURE — 93010 ELECTROCARDIOGRAM REPORT: CPT

## 2020-06-11 PROCEDURE — 71045 X-RAY EXAM CHEST 1 VIEW: CPT | Mod: 26

## 2020-06-11 PROCEDURE — 33361 REPLACE AORTIC VALVE PERQ: CPT | Mod: 62,Q0

## 2020-06-11 PROCEDURE — 99291 CRITICAL CARE FIRST HOUR: CPT

## 2020-06-11 RX ORDER — ACETAMINOPHEN 500 MG
1000 TABLET ORAL ONCE
Refills: 0 | Status: DISCONTINUED | OUTPATIENT
Start: 2020-06-11 | End: 2020-06-12

## 2020-06-11 RX ORDER — DEXTROSE 50 % IN WATER 50 %
50 SYRINGE (ML) INTRAVENOUS
Refills: 0 | Status: DISCONTINUED | OUTPATIENT
Start: 2020-06-11 | End: 2020-06-11

## 2020-06-11 RX ORDER — CLOPIDOGREL BISULFATE 75 MG/1
300 TABLET, FILM COATED ORAL ONCE
Refills: 0 | Status: COMPLETED | OUTPATIENT
Start: 2020-06-11 | End: 2020-06-11

## 2020-06-11 RX ORDER — POLYETHYLENE GLYCOL 3350 17 G/17G
17 POWDER, FOR SOLUTION ORAL DAILY
Refills: 0 | Status: DISCONTINUED | OUTPATIENT
Start: 2020-06-11 | End: 2020-06-12

## 2020-06-11 RX ORDER — HEPARIN SODIUM 5000 [USP'U]/ML
7500 INJECTION INTRAVENOUS; SUBCUTANEOUS EVERY 8 HOURS
Refills: 0 | Status: DISCONTINUED | OUTPATIENT
Start: 2020-06-11 | End: 2020-06-12

## 2020-06-11 RX ORDER — BRIMONIDINE TARTRATE 2 MG/MG
1 SOLUTION/ DROPS OPHTHALMIC
Refills: 0 | Status: DISCONTINUED | OUTPATIENT
Start: 2020-06-11 | End: 2020-06-12

## 2020-06-11 RX ORDER — INSULIN HUMAN 100 [IU]/ML
1 INJECTION, SOLUTION SUBCUTANEOUS
Qty: 50 | Refills: 0 | Status: DISCONTINUED | OUTPATIENT
Start: 2020-06-11 | End: 2020-06-11

## 2020-06-11 RX ORDER — ACETAMINOPHEN 500 MG
650 TABLET ORAL EVERY 6 HOURS
Refills: 0 | Status: DISCONTINUED | OUTPATIENT
Start: 2020-06-11 | End: 2020-06-12

## 2020-06-11 RX ORDER — PREGABALIN 225 MG/1
1000 CAPSULE ORAL DAILY
Refills: 0 | Status: DISCONTINUED | OUTPATIENT
Start: 2020-06-11 | End: 2020-06-12

## 2020-06-11 RX ORDER — ASCORBIC ACID 60 MG
500 TABLET,CHEWABLE ORAL EVERY 12 HOURS
Refills: 0 | Status: DISCONTINUED | OUTPATIENT
Start: 2020-06-11 | End: 2020-06-12

## 2020-06-11 RX ORDER — CLOPIDOGREL BISULFATE 75 MG/1
75 TABLET, FILM COATED ORAL DAILY
Refills: 0 | Status: DISCONTINUED | OUTPATIENT
Start: 2020-06-12 | End: 2020-06-12

## 2020-06-11 RX ORDER — GLUCAGON INJECTION, SOLUTION 0.5 MG/.1ML
1 INJECTION, SOLUTION SUBCUTANEOUS ONCE
Refills: 0 | Status: DISCONTINUED | OUTPATIENT
Start: 2020-06-11 | End: 2020-06-12

## 2020-06-11 RX ORDER — MAGNESIUM SULFATE 500 MG/ML
2 VIAL (ML) INJECTION ONCE
Refills: 0 | Status: COMPLETED | OUTPATIENT
Start: 2020-06-11 | End: 2020-06-11

## 2020-06-11 RX ORDER — HYDROMORPHONE HYDROCHLORIDE 2 MG/ML
0.5 INJECTION INTRAMUSCULAR; INTRAVENOUS; SUBCUTANEOUS ONCE
Refills: 0 | Status: DISCONTINUED | OUTPATIENT
Start: 2020-06-11 | End: 2020-06-11

## 2020-06-11 RX ORDER — ASPIRIN/CALCIUM CARB/MAGNESIUM 324 MG
81 TABLET ORAL DAILY
Refills: 0 | Status: DISCONTINUED | OUTPATIENT
Start: 2020-06-12 | End: 2020-06-12

## 2020-06-11 RX ORDER — CARVEDILOL PHOSPHATE 80 MG/1
1 CAPSULE, EXTENDED RELEASE ORAL
Qty: 0 | Refills: 0 | DISCHARGE

## 2020-06-11 RX ORDER — SODIUM CHLORIDE 9 MG/ML
1000 INJECTION, SOLUTION INTRAVENOUS
Refills: 0 | Status: DISCONTINUED | OUTPATIENT
Start: 2020-06-11 | End: 2020-06-12

## 2020-06-11 RX ORDER — ASPIRIN/CALCIUM CARB/MAGNESIUM 324 MG
81 TABLET ORAL ONCE
Refills: 0 | Status: COMPLETED | OUTPATIENT
Start: 2020-06-11 | End: 2020-06-11

## 2020-06-11 RX ORDER — SODIUM CHLORIDE 9 MG/ML
1000 INJECTION INTRAMUSCULAR; INTRAVENOUS; SUBCUTANEOUS
Refills: 0 | Status: DISCONTINUED | OUTPATIENT
Start: 2020-06-11 | End: 2020-06-12

## 2020-06-11 RX ORDER — CEFAZOLIN SODIUM 1 G
2000 VIAL (EA) INJECTION EVERY 8 HOURS
Refills: 0 | Status: DISCONTINUED | OUTPATIENT
Start: 2020-06-11 | End: 2020-06-12

## 2020-06-11 RX ORDER — DEXTROSE 50 % IN WATER 50 %
15 SYRINGE (ML) INTRAVENOUS ONCE
Refills: 0 | Status: DISCONTINUED | OUTPATIENT
Start: 2020-06-11 | End: 2020-06-12

## 2020-06-11 RX ORDER — PANTOPRAZOLE SODIUM 20 MG/1
40 TABLET, DELAYED RELEASE ORAL
Refills: 0 | Status: DISCONTINUED | OUTPATIENT
Start: 2020-06-11 | End: 2020-06-12

## 2020-06-11 RX ORDER — DORZOLAMIDE HYDROCHLORIDE 20 MG/ML
1 SOLUTION/ DROPS OPHTHALMIC
Refills: 0 | Status: DISCONTINUED | OUTPATIENT
Start: 2020-06-11 | End: 2020-06-12

## 2020-06-11 RX ORDER — CHLORHEXIDINE GLUCONATE 213 G/1000ML
5 SOLUTION TOPICAL EVERY 4 HOURS
Refills: 0 | Status: DISCONTINUED | OUTPATIENT
Start: 2020-06-11 | End: 2020-06-11

## 2020-06-11 RX ORDER — PANTOPRAZOLE SODIUM 20 MG/1
40 TABLET, DELAYED RELEASE ORAL DAILY
Refills: 0 | Status: DISCONTINUED | OUTPATIENT
Start: 2020-06-11 | End: 2020-06-11

## 2020-06-11 RX ORDER — INSULIN LISPRO 100/ML
VIAL (ML) SUBCUTANEOUS
Refills: 0 | Status: DISCONTINUED | OUTPATIENT
Start: 2020-06-11 | End: 2020-06-12

## 2020-06-11 RX ORDER — NICARDIPINE HYDROCHLORIDE 30 MG/1
5 CAPSULE, EXTENDED RELEASE ORAL
Qty: 40 | Refills: 0 | Status: DISCONTINUED | OUTPATIENT
Start: 2020-06-11 | End: 2020-06-12

## 2020-06-11 RX ORDER — AMLODIPINE BESYLATE 2.5 MG/1
5 TABLET ORAL DAILY
Refills: 0 | Status: DISCONTINUED | OUTPATIENT
Start: 2020-06-11 | End: 2020-06-12

## 2020-06-11 RX ORDER — CHLORHEXIDINE GLUCONATE 213 G/1000ML
15 SOLUTION TOPICAL EVERY 12 HOURS
Refills: 0 | Status: DISCONTINUED | OUTPATIENT
Start: 2020-06-11 | End: 2020-06-11

## 2020-06-11 RX ORDER — IRBESARTAN 75 MG/1
1 TABLET ORAL
Qty: 0 | Refills: 0 | DISCHARGE

## 2020-06-11 RX ORDER — CHLORHEXIDINE GLUCONATE 213 G/1000ML
1 SOLUTION TOPICAL
Refills: 0 | Status: DISCONTINUED | OUTPATIENT
Start: 2020-06-11 | End: 2020-06-12

## 2020-06-11 RX ORDER — DEXTROSE 50 % IN WATER 50 %
25 SYRINGE (ML) INTRAVENOUS
Refills: 0 | Status: DISCONTINUED | OUTPATIENT
Start: 2020-06-11 | End: 2020-06-11

## 2020-06-11 RX ORDER — SENNA PLUS 8.6 MG/1
2 TABLET ORAL AT BEDTIME
Refills: 0 | Status: DISCONTINUED | OUTPATIENT
Start: 2020-06-11 | End: 2020-06-12

## 2020-06-11 RX ADMIN — HEPARIN SODIUM 7500 UNIT(S): 5000 INJECTION INTRAVENOUS; SUBCUTANEOUS at 21:52

## 2020-06-11 RX ADMIN — POLYETHYLENE GLYCOL 3350 17 GRAM(S): 17 POWDER, FOR SOLUTION ORAL at 17:18

## 2020-06-11 RX ADMIN — BRIMONIDINE TARTRATE 1 DROP(S): 2 SOLUTION/ DROPS OPHTHALMIC at 19:13

## 2020-06-11 RX ADMIN — AMLODIPINE BESYLATE 5 MILLIGRAM(S): 2.5 TABLET ORAL at 17:18

## 2020-06-11 RX ADMIN — CHLORHEXIDINE GLUCONATE 1 APPLICATION(S): 213 SOLUTION TOPICAL at 17:18

## 2020-06-11 RX ADMIN — Medication 50 GRAM(S): at 19:14

## 2020-06-11 RX ADMIN — NICARDIPINE HYDROCHLORIDE 25 MG/HR: 30 CAPSULE, EXTENDED RELEASE ORAL at 17:50

## 2020-06-11 RX ADMIN — NICARDIPINE HYDROCHLORIDE 25 MG/HR: 30 CAPSULE, EXTENDED RELEASE ORAL at 10:47

## 2020-06-11 RX ADMIN — HYDROMORPHONE HYDROCHLORIDE 0.5 MILLIGRAM(S): 2 INJECTION INTRAMUSCULAR; INTRAVENOUS; SUBCUTANEOUS at 11:13

## 2020-06-11 RX ADMIN — Medication 81 MILLIGRAM(S): at 07:14

## 2020-06-11 RX ADMIN — CLOPIDOGREL BISULFATE 300 MILLIGRAM(S): 75 TABLET, FILM COATED ORAL at 07:14

## 2020-06-11 RX ADMIN — Medication 500 MILLIGRAM(S): at 19:13

## 2020-06-11 RX ADMIN — SODIUM CHLORIDE 10 MILLILITER(S): 9 INJECTION INTRAMUSCULAR; INTRAVENOUS; SUBCUTANEOUS at 10:47

## 2020-06-11 RX ADMIN — SENNA PLUS 2 TABLET(S): 8.6 TABLET ORAL at 21:52

## 2020-06-11 RX ADMIN — Medication 100 MILLIGRAM(S): at 21:53

## 2020-06-11 RX ADMIN — PANTOPRAZOLE SODIUM 40 MILLIGRAM(S): 20 TABLET, DELAYED RELEASE ORAL at 15:02

## 2020-06-11 RX ADMIN — DORZOLAMIDE HYDROCHLORIDE 1 DROP(S): 20 SOLUTION/ DROPS OPHTHALMIC at 19:13

## 2020-06-11 RX ADMIN — Medication 100 MILLIGRAM(S): at 15:03

## 2020-06-11 RX ADMIN — HEPARIN SODIUM 7500 UNIT(S): 5000 INJECTION INTRAVENOUS; SUBCUTANEOUS at 15:04

## 2020-06-11 RX ADMIN — PREGABALIN 1000 MICROGRAM(S): 225 CAPSULE ORAL at 17:18

## 2020-06-11 NOTE — H&P ADULT - NSHPPHYSICALEXAM_GEN_ALL_CORE
Appearance: No acute distress.  Neurologic: AAOx3, no AMS or focal deficits.  Responds appropriately to verbal and physical stimuli; exhibits purposeful movement in all extremities.  HEENT:   MMM, PERRLA, EOMI	b/l  Neck: Supple, + JVD/ - JVD; +/- Carotid Bruit   Cardiovascular: RRR, S1 S2. No m/r/g.  Respiratory: No acute respiratory distress. CTA b/l, no w/r/r.   Gastrointestinal:  Soft, non-tender, non-distended, + BS.	  Skin: No rashes. No ecchymoses. No cyanosis.  Extremities: Exhibits normal range of motion, no clubbing, cyanosis or edema.  Vascular: Peripheral pulses palpable 2+ bilaterally.

## 2020-06-11 NOTE — H&P ADULT - NSHPREVIEWOFSYSTEMS_GEN_ALL_CORE
Review of Systems  CONSTITUTIONAL:  Denies Fevers / chills, sweats, fatigue, weight loss, weight gain                                      NEURO:  Denies parathesias, seizures, syncope, confusion                                                                                EYES:  Denies Blurry vision, discharge, pain, loss of vision                                                                                    ENMT:  Denies Difficulty hearing, vertigo, dysphagia, epistaxis, recent dental work                                       CV:  Denies Chest pain, palpitations, WHEAT, orthopnea                                                                                          RESPIRATORY:  Denies Wheezing, SOB, cough / sputum, hemoptysis                                                                GI:  Denies Nausea, vommiting, diarrhea, constipation, melena, difficulty swallowing                                               : Denies Hematuria, dysuria, urgency, incontinence                                                                                         MUSKULOSKELETAL:  Denies arthritis, joint swelling, muscle weakness                                                             SKIN/BREAST:  Denies rash, itching, consuelo loss, masses                                                                                            PSYCH:  Denies depresion, anxiety, suicidal ideation                                                                                               HEME/LYMPH:  Denies bruises easily, enlarged lymph nodes, tender lymph nodes                                        ENDOCRINE:  Denies cold intolerance, heat intolerance, polydipsia Review of Systems  CONSTITUTIONAL:  Denies Fevers / chills, sweats, fatigue, weight loss, weight gain                                      NEURO:  Denies parathesias, seizures, syncope, confusion                                                                                EYES:  Denies Blurry vision, discharge, pain, loss of vision                                                                                    ENMT:  Denies Difficulty hearing, vertigo, dysphagia, epistaxis, recent dental work                                       CV:  + WHEAT, Denies Chest pain, palpitations,  orthopnea                                                                                          RESPIRATORY:  Denies Wheezing, SOB, cough / sputum, hemoptysis                                                                GI:  Denies Nausea, vommiting, diarrhea, constipation, melena, difficulty swallowing                                               : Denies Hematuria, dysuria, urgency, incontinence                                                                                         MUSKULOSKELETAL:  Denies arthritis, joint swelling, muscle weakness                                                             SKIN/BREAST:  Denies rash, itching, consuelo loss, masses                                                                                            PSYCH:  Denies depresion, anxiety, suicidal ideation                                                                                               HEME/LYMPH:  Denies bruises easily, enlarged lymph nodes, tender lymph nodes                                        ENDOCRINE:  Denies cold intolerance, heat intolerance, polydipsia

## 2020-06-11 NOTE — PROGRESS NOTE ADULT - SUBJECTIVE AND OBJECTIVE BOX
CTICU  CRITICAL  CARE  attending     Hand off received 					   Pertinent clinical, laboratory, radiographic, hemodynamic, echocardiographic, respiratory data, microbiologic data and chart were reviewed and analyzed frequently throughout the course of the day and night  Patient seen and examined with CTS/ SH attending at bedside    Pt is a 84y , Male, post op s/p TF-TAVR ( [place] valve)  hx of CKD            , FAMILY HISTORY:  Family history of cerebral hemorrhage (Father): Father  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  ICU Vital Signs Last 24 Hrs  T(C): 36.1 (11 Jun 2020 14:00), Max: 36.1 (11 Jun 2020 14:00)  T(F): 97 (11 Jun 2020 14:00), Max: 97 (11 Jun 2020 14:00)  HR: 90 (11 Jun 2020 14:05) (75 - 92)  BP: 129/60 (11 Jun 2020 11:15) (129/60 - 147/66)  BP(mean): 86 (11 Jun 2020 11:15) (86 - 95)  ABP: 152/56 (11 Jun 2020 14:05) (152/56 - 195/74)  ABP(mean): 86 (11 Jun 2020 14:05) (82 - 111)  RR: 20 (11 Jun 2020 14:05) (18 - 24)  SpO2: 100% (11 Jun 2020 14:05) (98% - 100%)    Adult Advanced Hemodynamics Last 24 Hrs  CVP(mm Hg): --  CVP(cm H2O): --  CO: --  CI: --  PA: --  PA(mean): --  PCWP: --  SVR: --  SVRI: --  PVR: --  PVRI: --, ABG - ( 11 Jun 2020 10:08 )  pH, Arterial: 7.37  pH, Blood: x     /  pCO2: 42    /  pO2: 110   / HCO3: 24    / Base Excess: -1.5  /  SaO2: 98                  Intake and output was reviewed and the fluid balance was calculated  Daily     Daily   I&O's Summary    11 Jun 2020 07:01  -  11 Jun 2020 15:08  --------------------------------------------------------  IN: 25 mL / OUT: 200 mL / NET: -175 mL        All lines and drain sites were assessed  [facility]      POCT Blood Glucose.: 124 mg/dL (11 Jun 2020 06:40)    No acute change in mental status  Auscultation of the chest reveals equal bs  Abdomen is soft  Extremities are warm and well perfused  Wounds appear clean and unremarkable  Antibiotics are periop    labs  CBC Full  -  ( 11 Jun 2020 10:13 )  WBC Count : 10.60 K/uL  RBC Count : 3.95 M/uL  Hemoglobin : 10.5 g/dL  Hematocrit : 32.4 %  Platelet Count - Automated : 247 K/uL  Mean Cell Volume : 82.0 fl  Mean Cell Hemoglobin : 26.6 pg  Mean Cell Hemoglobin Concentration : 32.4 gm/dL  Auto Neutrophil # : 8.64 K/uL  Auto Lymphocyte # : 1.30 K/uL  Auto Monocyte # : 0.42 K/uL  Auto Eosinophil # : 0.13 K/uL  Auto Basophil # : 0.04 K/uL  Auto Neutrophil % : 81.4 %  Auto Lymphocyte % : 12.3 %  Auto Monocyte % : 4.0 %  Auto Eosinophil % : 1.2 %  Auto Basophil % : 0.4 %    06-11    139  |  109<H>  |  22  ----------------------------<  145<H>  4.2   |  21<L>  |  1.26    Ca    8.8      11 Jun 2020 10:13  Phos  3.1     06-11  Mg     2.0     06-11    TPro  6.8  /  Alb  3.3  /  TBili  0.4  /  DBili  x   /  AST  26  /  ALT  18  /  AlkPhos  85  06-11    PT/INR - ( 11 Jun 2020 10:13 )   PT: 13.6 sec;   INR: 1.19          PTT - ( 11 Jun 2020 10:13 )  PTT:35.0 sec  The current medications were reviewed   MEDICATIONS  (STANDING):  ascorbic acid 500 milliGRAM(s) Oral every 12 hours  brimonidine 0.2% Ophthalmic Solution 1 Drop(s) Both EYES two times a day  ceFAZolin   IVPB 2000 milliGRAM(s) IV Intermittent every 8 hours  chlorhexidine 2% Cloths 1 Application(s) Topical <User Schedule>  cyanocobalamin 1000 MICROGram(s) Oral daily  dextrose 50% Injectable 50 milliLiter(s) IV Push every 15 minutes  dextrose 50% Injectable 25 milliLiter(s) IV Push every 15 minutes  dorzolamide 2% Ophthalmic Solution 1 Drop(s) Both EYES <User Schedule>  heparin   Injectable 7500 Unit(s) SubCutaneous every 8 hours  insulin regular Infusion 1 Unit(s)/Hr (1 mL/Hr) IV Continuous <Continuous>  niCARdipine Infusion 5 mG/Hr (25 mL/Hr) IV Continuous <Continuous>  pantoprazole  Injectable 40 milliGRAM(s) IV Push daily  sodium chloride 0.9%. 1000 milliLiter(s) (10 mL/Hr) IV Continuous <Continuous>    MEDICATIONS  (PRN):       PROBLEM LIST/ ASSESSMENT:  HEALTH ISSUES - PROBLEM Dx:      ,   Patient is a 84y old  Male who presents with a chief complaint of severe AS (11 Jun 2020 13:40)     s/p TAVR      My plan includes :  close hemodynamic, ventilatory and drain monitoring and management per post op routine    Monitor for arrhythmias and monitor parameters for organ perfusion  monitor neurologic status  Head of the bed should remain elevated to 45 deg .   chest PT and IS will be encouraged  monitor adequacy of oxygenation and ventilation and attempt to wean oxygen  Nutritional goals will be met using po eventually , ensure adequate caloric intake and montior the same  Stress ulcer and VTE prophylaxis will be achieved    Glycemic control is satisfactory  Electrolytes have been repleted as necessary and wound care has been carried out. Pain control has been achieved.   agressive physical therapy and early mobility and ambulation goals will be met   The family was updated about the course and plan  CRITICAL CARE TIME SPENT in evaluation and management, reassessments, review and interpretation of labs and x-rays, ventilator and hemodynamic management, formulating a plan and coordinating care: __50___ MIN.  Time does not include procedural time.  CTICU ATTENDING     					    Srinivas Stephens MD

## 2020-06-11 NOTE — H&P ADULT - HISTORY OF PRESENT ILLNESS
85 y/o male former smoker This is a 83 y/o male former smoker with a hx of HTN, HLD, DM (on oral meds), chronic diastolic CHF with known moderate to severe AS who was referred for his valvular disease. Over the past 6 months, patient has been experiencing worsening WHEAT after walking a few blocks or a flight of stairs (Class III NYHA), along with increase in fatigue. Patient had an echo in february 2020 which showed a heavily calcified aortic valve with MPG of 35 mmhg. He had a repeat echo in June 2020 which confirmed his severe AS. Patient presented for a cath last week but was unable to lay flat due to back pain. He underwent the rest of his preoperative workup and now presents for his planned TAVR. He is in his usual state of health and denies any SOB at rest, chest pain, palpitations, dizziness/lightheadedeness, LE edema or syncope.

## 2020-06-11 NOTE — H&P ADULT - NSICDXFAMILYHX_GEN_ALL_CORE_FT
FAMILY HISTORY:  Father  Still living? No  Family history of cerebral hemorrhage, Age at diagnosis: Age Unknown

## 2020-06-11 NOTE — CONSULT NOTE ADULT - SUBJECTIVE AND OBJECTIVE BOX
HISTORY OF PRESENT ILLNESS:   This is a 83 y/o male former smoker with a hx of HTN, HLD, DM (on oral meds), chronic diastolic CHF with known moderate to severe AS who was referred for his valvular disease. Over the past 6 months, patient has been experiencing worsening WHEAT after walking a few blocks or a flight of stairs (Class III NYHA), along with increase in fatigue. Patient had an echo in february 2020 which showed a heavily calcified aortic valve with MPG of 35 mmhg. He had a repeat echo in June 2020 which confirmed his severe AS. Patient presented for a cath last week but was unable to lay flat due to back pain. He underwent the rest of his preoperative workup and now presents for his planned TAVR. He is in his usual state of health and denies any SOB at rest, chest pain, palpitations, dizziness/lightheadedeness, LE edema or syncope.     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  Cataract: Bilateral    FAMILY HISTORY:  Family history of cerebral hemorrhage (Father): Father      SOCIAL HISTORY:  Tobacco Use:  EtOH use:   Substance:    Allergies    No Known Allergies    Intolerances        REVIEW OF SYSTEMS  as mentioned in HPI      MEDICATIONS:  Antibiotics:  ceFAZolin   IVPB 2000 milliGRAM(s) IV Intermittent every 8 hours    Neuro:    Anticoagulation:  heparin   Injectable 7500 Unit(s) SubCutaneous every 8 hours    OTHER:  brimonidine 0.2% Ophthalmic Solution 1 Drop(s) Both EYES two times a day  dextrose 50% Injectable 50 milliLiter(s) IV Push every 15 minutes  dextrose 50% Injectable 25 milliLiter(s) IV Push every 15 minutes  dorzolamide 2% Ophthalmic Solution 1 Drop(s) Both EYES <User Schedule>  insulin regular Infusion 1 Unit(s)/Hr IV Continuous <Continuous>  niCARdipine Infusion 5 mG/Hr IV Continuous <Continuous>  pantoprazole  Injectable 40 milliGRAM(s) IV Push daily    IVF:  ascorbic acid 500 milliGRAM(s) Oral every 12 hours  cyanocobalamin 1000 MICROGram(s) Oral daily  sodium chloride 0.9%. 1000 milliLiter(s) IV Continuous <Continuous>      Vital Signs Last 24 Hrs  T(C): 35.8 (11 Jun 2020 10:47), Max: 35.8 (11 Jun 2020 10:00)  T(F): 96.4 (11 Jun 2020 10:47), Max: 96.5 (11 Jun 2020 10:00)  HR: 92 (11 Jun 2020 13:00) (75 - 92)  BP: 129/60 (11 Jun 2020 11:15) (129/60 - 147/66)  BP(mean): 86 (11 Jun 2020 11:15) (86 - 95)  RR: 19 (11 Jun 2020 13:00) (18 - 24)  SpO2: 99% (11 Jun 2020 13:00) (98% - 100%)    PHYSICAL EXAM:  Gen: laying in hospital bed, NAD  Neuro: AA+OX3, OE spont, FC  CN II-XII: PERRL, EOMI  Motor: MAEx4 with good strength    LABS:                        10.5   10.60 )-----------( 247      ( 11 Jun 2020 10:13 )             32.4     06-11    139  |  109<H>  |  22  ----------------------------<  145<H>  4.2   |  21<L>  |  1.26    Ca    8.8      11 Jun 2020 10:13  Phos  3.1     06-11  Mg     2.0     06-11    TPro  6.8  /  Alb  3.3  /  TBili  0.4  /  DBili  x   /  AST  26  /  ALT  18  /  AlkPhos  85  06-11    PT/INR - ( 11 Jun 2020 10:13 )   PT: 13.6 sec;   INR: 1.19          PTT - ( 11 Jun 2020 10:13 )  PTT:35.0 sec    CULTURES:      RADIOLOGY & ADDITIONAL STUDIES:    Assessment:  83 y/o male with T12 fracture incidentally found on CT Angio Abdomen and Pelvis    Plan:  - neuro checks  - pain control  - brace when OOB x6 weeks  - follow up with Dr. Chaparro outpatient, please call the office to make an apt [phone]    d/w Dr. Chaparro

## 2020-06-11 NOTE — H&P ADULT - ASSESSMENT
This is a 85 y/o male former smoker with a hx of HTN, HLD, DM (on oral meds), chronic diastolic CHF with known moderate to severe AS who was referred for his valvular disease. He now presents for his planned TAVR    - consent obtained, chart reviewed  - labs, EKG performed  - patient given ASA 81mg and Plavix 300 mg prior to OR  - blood on hold for OR  - plan for general anesthesia, femoral TVP and Choi Temitope valve  - COVID test negative 6/9  - plan for 9east post op

## 2020-06-11 NOTE — H&P ADULT - NSHPLABSRESULTS_GEN_ALL_CORE
CT Coronaries  Impression:   1.  Please note this study was not optimized for the evaluation ofthe coronary arteries.  2.  Severe stenosis in mid RCA.  3.  Moderate stenosis in proximal RCA and mid LCX  4.  Possible obstructive stenosis in OM2 and distal RCA.  5.  RPDA and RPL are probably non-obstructive.  6.  Non-obstructive disease in remaining coronary segments.  Please see separate radiology report for non-coronary findings.      CT Head:  No intracranial hemorrhage or acute transcortical infarct. Minimum small vessel ischemic disease.    CT A/P:  IMPRESSION:  Aortic valvular calcifications.  Patent abdominal aorta and iliac arteries. No aortic aneurysm.  Oblique fracture through the T12 vertebral body, is likely acute without retropulsion.    < from: TTE Echo Complete w/o contrast w/ Doppler (06.04.20 @ 09:58) >    Left Ventricle:  There is moderate concentric left ventricular hypertrophy. There are no regional wall motion abnormalities seen. Left ventricular systolic function is hyperdynamic with a calculated ejection fraction of >75%. Analysis of mitral annular tissue Doppler, left atrial volume index, and tricuspid regurgitant velocity reveals: grade I diastolic dysfunction without elevated filling pressure.     Right Ventricle:  The right ventricle is normal in size. Right ventricular systolic function is normal. The tricuspid annular plane systolic excursion (TAPSE) is 26.00 mm (normal >=17 mm).     Left Atrium:  The left atrium is normal in size.     Right Atrium:  The right atrium is normal in size.     Aortic Valve:  The aortic leaflets are thickened and calcified with reduced systolic leaflet excursion. There is severe aortic stenosis. The peak transvalvular velocity is 4.53 m/s, the mean transvalvular gradient is 48.00 mmHg, and the LVOT/AV velocity ratio is 0.26. The peak transaortic gradient is 82.08 mmHg. The aortic valve area (estimated via the continuity method) is 1.0 cm². There is no evidence of aortic regurgitation.     Mitral Valve:  Structurally normal mitral valve with normal leaflet excursion. There is trace mitral regurgitation.     Tricuspid Valve:  Structurally normal tricuspid valvewith normal leaflet excursion. There is trace tricuspid regurgitation. Pulmonary artery systolic pressure (estimated using the tricuspid regurgitant gradient and an estimate of right atrial pressure) is 38 mmHg.     Inferior Vena Cava:  The inferior vena cava is normal in size (<2.1cm) with normal inspiratory collapse (>50%) consistent with normal right atrial pressure (  3, range 0-5mmHg).     Pulmonic Valve:  The pulmonic valve is not well visualized. There is trace pulmonic regurgitation.     Aorta:  The aortic root is normal in size. The aortic arch is normal without evidence of aortic coarctation. CT Coronaries  Impression:   1.  Please note this study was not optimized for the evaluation ofthe coronary arteries.  2.  Severe stenosis in mid RCA.  3.  Moderate stenosis in proximal RCA and mid LCX  4.  Possible obstructive stenosis in OM2 and distal RCA.  5.  RPDA and RPL are probably non-obstructive.  6.  Non-obstructive disease in remaining coronary segments.  Please see separate radiology report for non-coronary findings.      CT Head:  No intracranial hemorrhage or acute transcortical infarct. Minimum small vessel ischemic disease.    CT A/P:  IMPRESSION:  Aortic valvular calcifications.  Patent abdominal aorta and iliac arteries. No aortic aneurysm.  Oblique fracture through the T12 vertebral body, is likely acute without retropulsion.    TTE  Left Ventricle:  There is moderate concentric left ventricular hypertrophy. There are no regional wall motion abnormalities seen. Left ventricular systolic function is hyperdynamic with a calculated ejection fraction of >75%. Analysis of mitral annular tissue Doppler, left atrial volume index, and tricuspid regurgitant velocity reveals: grade I diastolic dysfunction without elevated filling pressure.     Right Ventricle:  The right ventricle is normal in size. Right ventricular systolic function is normal. The tricuspid annular plane systolic excursion (TAPSE) is 26.00 mm (normal >=17 mm).     Left Atrium:  The left atrium is normal in size.     Right Atrium:  The right atrium is normal in size.     Aortic Valve:  The aortic leaflets are thickened and calcified with reduced systolic leaflet excursion. There is severe aortic stenosis. The peak transvalvular velocity is 4.53 m/s, the mean transvalvular gradient is 48.00 mmHg, and the LVOT/AV velocity ratio is 0.26. The peak transaortic gradient is 82.08 mmHg. The aortic valve area (estimated via the continuity method) is 1.0 cm². There is no evidence of aortic regurgitation.     Mitral Valve:  Structurally normal mitral valve with normal leaflet excursion. There is trace mitral regurgitation.     Tricuspid Valve:  Structurally normal tricuspid valvewith normal leaflet excursion. There is trace tricuspid regurgitation. Pulmonary artery systolic pressure (estimated using the tricuspid regurgitant gradient and an estimate of right atrial pressure) is 38 mmHg.     Inferior Vena Cava:  The inferior vena cava is normal in size (<2.1cm) with normal inspiratory collapse (>50%) consistent with normal right atrial pressure (  3, range 0-5mmHg).     Pulmonic Valve:  The pulmonic valve is not well visualized. There is trace pulmonic regurgitation.     Aorta:  The aortic root is normal in size. The aortic arch is normal without evidence of aortic coarctation.

## 2020-06-12 ENCOUNTER — TRANSCRIPTION ENCOUNTER (OUTPATIENT)
Age: 85
End: 2020-06-12

## 2020-06-12 VITALS — OXYGEN SATURATION: 95 % | SYSTOLIC BLOOD PRESSURE: 176 MMHG | HEART RATE: 94 BPM | DIASTOLIC BLOOD PRESSURE: 80 MMHG

## 2020-06-12 LAB
ALBUMIN SERPL ELPH-MCNC: 2.9 G/DL — LOW (ref 3.3–5)
ALP SERPL-CCNC: 85 U/L — SIGNIFICANT CHANGE UP (ref 40–120)
ALT FLD-CCNC: 14 U/L — SIGNIFICANT CHANGE UP (ref 10–45)
ANION GAP SERPL CALC-SCNC: 7 MMOL/L — SIGNIFICANT CHANGE UP (ref 5–17)
APTT BLD: 33.2 SEC — SIGNIFICANT CHANGE UP (ref 27.5–36.3)
AST SERPL-CCNC: 22 U/L — SIGNIFICANT CHANGE UP (ref 10–40)
BILIRUB SERPL-MCNC: 0.3 MG/DL — SIGNIFICANT CHANGE UP (ref 0.2–1.2)
BUN SERPL-MCNC: 17 MG/DL — SIGNIFICANT CHANGE UP (ref 7–23)
CALCIUM SERPL-MCNC: 8.7 MG/DL — SIGNIFICANT CHANGE UP (ref 8.4–10.5)
CHLORIDE SERPL-SCNC: 107 MMOL/L — SIGNIFICANT CHANGE UP (ref 96–108)
CO2 SERPL-SCNC: 23 MMOL/L — SIGNIFICANT CHANGE UP (ref 22–31)
CREAT SERPL-MCNC: 1.25 MG/DL — SIGNIFICANT CHANGE UP (ref 0.5–1.3)
GAS PNL BLDA: SIGNIFICANT CHANGE UP
GLUCOSE BLDC GLUCOMTR-MCNC: 108 MG/DL — HIGH (ref 70–99)
GLUCOSE SERPL-MCNC: 112 MG/DL — HIGH (ref 70–99)
HCT VFR BLD CALC: 31.8 % — LOW (ref 39–50)
HGB BLD-MCNC: 10.4 G/DL — LOW (ref 13–17)
INR BLD: 1.2 — HIGH (ref 0.88–1.16)
MAGNESIUM SERPL-MCNC: 2.3 MG/DL — SIGNIFICANT CHANGE UP (ref 1.6–2.6)
MCHC RBC-ENTMCNC: 26.3 PG — LOW (ref 27–34)
MCHC RBC-ENTMCNC: 32.7 GM/DL — SIGNIFICANT CHANGE UP (ref 32–36)
MCV RBC AUTO: 80.5 FL — SIGNIFICANT CHANGE UP (ref 80–100)
NRBC # BLD: 0 /100 WBCS — SIGNIFICANT CHANGE UP (ref 0–0)
PHOSPHATE SERPL-MCNC: 2.9 MG/DL — SIGNIFICANT CHANGE UP (ref 2.5–4.5)
PLATELET # BLD AUTO: 262 K/UL — SIGNIFICANT CHANGE UP (ref 150–400)
POTASSIUM SERPL-MCNC: 4.2 MMOL/L — SIGNIFICANT CHANGE UP (ref 3.5–5.3)
POTASSIUM SERPL-SCNC: 4.2 MMOL/L — SIGNIFICANT CHANGE UP (ref 3.5–5.3)
PROT SERPL-MCNC: 6.5 G/DL — SIGNIFICANT CHANGE UP (ref 6–8.3)
PROTHROM AB SERPL-ACNC: 13.8 SEC — HIGH (ref 10–12.9)
RBC # BLD: 3.95 M/UL — LOW (ref 4.2–5.8)
RBC # FLD: 14.5 % — SIGNIFICANT CHANGE UP (ref 10.3–14.5)
SODIUM SERPL-SCNC: 137 MMOL/L — SIGNIFICANT CHANGE UP (ref 135–145)
WBC # BLD: 10.97 K/UL — HIGH (ref 3.8–10.5)
WBC # FLD AUTO: 10.97 K/UL — HIGH (ref 3.8–10.5)

## 2020-06-12 PROCEDURE — 97161 PT EVAL LOW COMPLEX 20 MIN: CPT

## 2020-06-12 PROCEDURE — 85027 COMPLETE CBC AUTOMATED: CPT

## 2020-06-12 PROCEDURE — 85730 THROMBOPLASTIN TIME PARTIAL: CPT

## 2020-06-12 PROCEDURE — 93306 TTE W/DOPPLER COMPLETE: CPT

## 2020-06-12 PROCEDURE — C1769: CPT

## 2020-06-12 PROCEDURE — 80053 COMPREHEN METABOLIC PANEL: CPT

## 2020-06-12 PROCEDURE — 83735 ASSAY OF MAGNESIUM: CPT

## 2020-06-12 PROCEDURE — 93010 ELECTROCARDIOGRAM REPORT: CPT

## 2020-06-12 PROCEDURE — 86901 BLOOD TYPING SEROLOGIC RH(D): CPT

## 2020-06-12 PROCEDURE — 86923 COMPATIBILITY TEST ELECTRIC: CPT

## 2020-06-12 PROCEDURE — 71045 X-RAY EXAM CHEST 1 VIEW: CPT | Mod: 26

## 2020-06-12 PROCEDURE — 85025 COMPLETE CBC W/AUTO DIFF WBC: CPT

## 2020-06-12 PROCEDURE — 36415 COLL VENOUS BLD VENIPUNCTURE: CPT

## 2020-06-12 PROCEDURE — C1887: CPT

## 2020-06-12 PROCEDURE — 93005 ELECTROCARDIOGRAM TRACING: CPT

## 2020-06-12 PROCEDURE — C1894: CPT

## 2020-06-12 PROCEDURE — C1760: CPT

## 2020-06-12 PROCEDURE — 99232 SBSQ HOSP IP/OBS MODERATE 35: CPT

## 2020-06-12 PROCEDURE — 84295 ASSAY OF SERUM SODIUM: CPT

## 2020-06-12 PROCEDURE — 80048 BASIC METABOLIC PNL TOTAL CA: CPT

## 2020-06-12 PROCEDURE — 84132 ASSAY OF SERUM POTASSIUM: CPT

## 2020-06-12 PROCEDURE — 86850 RBC ANTIBODY SCREEN: CPT

## 2020-06-12 PROCEDURE — 82330 ASSAY OF CALCIUM: CPT

## 2020-06-12 PROCEDURE — 85610 PROTHROMBIN TIME: CPT

## 2020-06-12 PROCEDURE — 93306 TTE W/DOPPLER COMPLETE: CPT | Mod: 26

## 2020-06-12 PROCEDURE — 93312 ECHO TRANSESOPHAGEAL: CPT

## 2020-06-12 PROCEDURE — C1889: CPT

## 2020-06-12 PROCEDURE — 84100 ASSAY OF PHOSPHORUS: CPT

## 2020-06-12 PROCEDURE — 82962 GLUCOSE BLOOD TEST: CPT

## 2020-06-12 PROCEDURE — 71045 X-RAY EXAM CHEST 1 VIEW: CPT

## 2020-06-12 PROCEDURE — 82803 BLOOD GASES ANY COMBINATION: CPT

## 2020-06-12 RX ORDER — CARVEDILOL PHOSPHATE 80 MG/1
3.12 CAPSULE, EXTENDED RELEASE ORAL ONCE
Refills: 0 | Status: COMPLETED | OUTPATIENT
Start: 2020-06-12 | End: 2020-06-12

## 2020-06-12 RX ORDER — PANTOPRAZOLE SODIUM 20 MG/1
1 TABLET, DELAYED RELEASE ORAL
Qty: 30 | Refills: 0
Start: 2020-06-12 | End: 2020-07-11

## 2020-06-12 RX ORDER — ACETAMINOPHEN 500 MG
1 TABLET ORAL
Qty: 120 | Refills: 0
Start: 2020-06-12 | End: 2020-07-11

## 2020-06-12 RX ORDER — CLOPIDOGREL BISULFATE 75 MG/1
1 TABLET, FILM COATED ORAL
Qty: 30 | Refills: 0
Start: 2020-06-12 | End: 2020-07-11

## 2020-06-12 RX ORDER — ATORVASTATIN CALCIUM 80 MG/1
1 TABLET, FILM COATED ORAL
Qty: 30 | Refills: 0
Start: 2020-06-12 | End: 2020-07-11

## 2020-06-12 RX ORDER — OXYCODONE HYDROCHLORIDE 5 MG/1
1 TABLET ORAL
Qty: 12 | Refills: 0
Start: 2020-06-12 | End: 2020-06-14

## 2020-06-12 RX ORDER — SENNA PLUS 8.6 MG/1
2 TABLET ORAL
Qty: 60 | Refills: 0
Start: 2020-06-12 | End: 2020-07-11

## 2020-06-12 RX ORDER — LOSARTAN POTASSIUM 100 MG/1
50 TABLET, FILM COATED ORAL DAILY
Refills: 0 | Status: DISCONTINUED | OUTPATIENT
Start: 2020-06-12 | End: 2020-06-12

## 2020-06-12 RX ORDER — CARVEDILOL PHOSPHATE 80 MG/1
3.12 CAPSULE, EXTENDED RELEASE ORAL EVERY 12 HOURS
Refills: 0 | Status: DISCONTINUED | OUTPATIENT
Start: 2020-06-12 | End: 2020-06-12

## 2020-06-12 RX ORDER — FUROSEMIDE 40 MG
1 TABLET ORAL
Qty: 0 | Refills: 0 | DISCHARGE

## 2020-06-12 RX ADMIN — POLYETHYLENE GLYCOL 3350 17 GRAM(S): 17 POWDER, FOR SOLUTION ORAL at 11:04

## 2020-06-12 RX ADMIN — LOSARTAN POTASSIUM 50 MILLIGRAM(S): 100 TABLET, FILM COATED ORAL at 09:04

## 2020-06-12 RX ADMIN — CARVEDILOL PHOSPHATE 3.12 MILLIGRAM(S): 80 CAPSULE, EXTENDED RELEASE ORAL at 11:04

## 2020-06-12 RX ADMIN — Medication 500 MILLIGRAM(S): at 06:49

## 2020-06-12 RX ADMIN — AMLODIPINE BESYLATE 5 MILLIGRAM(S): 2.5 TABLET ORAL at 05:49

## 2020-06-12 RX ADMIN — PREGABALIN 1000 MICROGRAM(S): 225 CAPSULE ORAL at 11:04

## 2020-06-12 RX ADMIN — Medication 81 MILLIGRAM(S): at 11:04

## 2020-06-12 RX ADMIN — Medication 100 MILLIGRAM(S): at 11:05

## 2020-06-12 RX ADMIN — CLOPIDOGREL BISULFATE 75 MILLIGRAM(S): 75 TABLET, FILM COATED ORAL at 11:04

## 2020-06-12 RX ADMIN — CHLORHEXIDINE GLUCONATE 1 APPLICATION(S): 213 SOLUTION TOPICAL at 06:53

## 2020-06-12 RX ADMIN — PANTOPRAZOLE SODIUM 40 MILLIGRAM(S): 20 TABLET, DELAYED RELEASE ORAL at 06:51

## 2020-06-12 RX ADMIN — HEPARIN SODIUM 7500 UNIT(S): 5000 INJECTION INTRAVENOUS; SUBCUTANEOUS at 11:05

## 2020-06-12 RX ADMIN — BRIMONIDINE TARTRATE 1 DROP(S): 2 SOLUTION/ DROPS OPHTHALMIC at 06:50

## 2020-06-12 RX ADMIN — DORZOLAMIDE HYDROCHLORIDE 1 DROP(S): 20 SOLUTION/ DROPS OPHTHALMIC at 06:50

## 2020-06-12 RX ADMIN — CARVEDILOL PHOSPHATE 3.12 MILLIGRAM(S): 80 CAPSULE, EXTENDED RELEASE ORAL at 08:20

## 2020-06-12 RX ADMIN — Medication 100 MILLIGRAM(S): at 06:49

## 2020-06-12 RX ADMIN — HEPARIN SODIUM 7500 UNIT(S): 5000 INJECTION INTRAVENOUS; SUBCUTANEOUS at 05:49

## 2020-06-12 NOTE — DISCHARGE NOTE PROVIDER - NSDCMRMEDTOKEN_GEN_ALL_CORE_FT
acetaminophen-oxyCODONE 325 mg-5 mg oral tablet: 2 tab(s) orally every 6 hours, As needed, Moderate Pain  acetaminophen-oxyCODONE 325 mg-5 mg oral tablet: 1 tab(s) orally every 4 hours, As needed, Mild Pain  Alphagan 0.1% ophthalmic solution:  to each affected eye 2 times a day  ascorbic acid 500 mg oral tablet: 1 tab(s) orally 2 times a day  carvedilol 6.25 mg oral tablet: 1 tab(s) orally once a day  cloNIDine 0.1 mg oral tablet:  orally once a day  docusate sodium 100 mg oral capsule: 1 cap(s) orally 3 times a day  dorzolamide ophthalmic 2% ophthalmic solution:  to each affected eye 2 times a day  Ecotrin Adult Low Strength: 81 milligram(s) orally once a day  Exforge 10 mg-320 mg oral tablet: 1 tab(s) orally once a day  Feosol 325 mg (65 mg elemental iron) oral tablet: 1 tab(s) orally 3 times a day  folic acid 1 mg oral tablet: 1 tab(s) orally once a day  glipiZIDE 5 mg oral tablet: 1 tab(s) orally once a day  irbesartan 300 mg oral tablet: 1 tab(s) orally once a day  Lasix 40 mg oral tablet: 1 tab(s) orally once a day  metFORMIN 500 mg oral tablet: 1 tab(s) orally 2 times a day  Multiple Vitamins oral tablet: 1 tab(s) orally once a day  rivaroxaban 10 mg oral tablet: 1 tab(s) orally once a day  Vitamin B12 1000 mcg oral tablet: 1 tab(s) orally once a day  Vitamin D3 1000 intl units oral capsule: 1 cap(s) orally once a day acetaminophen 325 mg oral tablet: 1 tab(s) orally every 6 hours, As Needed -Temp greater or equal to 38C (100.4F), Mild Pain (1 - 3) MDD:4 tabs  acetaminophen-oxyCODONE 325 mg-5 mg oral tablet: 1 tab(s) orally every 6 hours, As Needed -Mild Pain MDD:4 tabs  Alphagan 0.1% ophthalmic solution:  to each affected eye 2 times a day  ascorbic acid 500 mg oral tablet: 1 tab(s) orally 2 times a day  carvedilol 6.25 mg oral tablet: 1 tab(s) orally once a day  clopidogrel 75 mg oral tablet: 1 tab(s) orally once a day  docusate sodium 100 mg oral capsule: 1 cap(s) orally 3 times a day  dorzolamide ophthalmic 2% ophthalmic solution:  to each affected eye 2 times a day  Ecotrin Adult Low Strength: 81 milligram(s) orally once a day  Feosol 325 mg (65 mg elemental iron) oral tablet: 1 tab(s) orally 3 times a day  folic acid 1 mg oral tablet: 1 tab(s) orally once a day  glipiZIDE 5 mg oral tablet: 1 tab(s) orally once a day  irbesartan 300 mg oral tablet: 1 tab(s) orally once a day  Lipitor 20 mg oral tablet: 1 tab(s) orally once a day   metFORMIN 500 mg oral tablet: 1 tab(s) orally 2 times a day  pantoprazole 40 mg oral delayed release tablet: 1 tab(s) orally once a day (before a meal)  senna oral tablet: 2 tab(s) orally once a day (at bedtime)  Vitamin B12 1000 mcg oral tablet: 1 tab(s) orally once a day  Vitamin D3 1000 intl units oral capsule: 1 cap(s) orally once a day

## 2020-06-12 NOTE — DISCHARGE NOTE PROVIDER - NSDCFUADDAPPT_GEN_ALL_CORE_FT
Dr. Arana's office will contact you prior to your Telehealth appointment on 6/22/20 at 9:30am for further instructions.      Dr. Moody will follow-up with you on 6/18/20 at 2:45pm.  Her office information is [address] [place] [address]. Phone: [phone]     Dr. Chaparro's office (neurosurgery) will contact you with your follow-up information, date and time.

## 2020-06-12 NOTE — PHYSICAL THERAPY INITIAL EVALUATION ADULT - AMBULATION SKILLS, REHAB EVAL
“You can access the FollowHealth Patient Portal, offered by [facility], by registering with the following website: [email or web address]”
independent

## 2020-06-12 NOTE — PHYSICAL THERAPY INITIAL EVALUATION ADULT - CRITERIA FOR SKILLED THERAPEUTIC INTERVENTIONS
impairments found/therapy frequency/predicted duration of therapy intervention/anticipated discharge recommendation/functional limitations in following categories/risk reduction/prevention

## 2020-06-12 NOTE — DISCHARGE NOTE PROVIDER - PROVIDER TOKENS
PROVIDER:[TOKEN:[[id number]]],PROVIDER:[TOKEN:[[id number]]],PROVIDER:[TOKEN:[[id number]]] PROVIDER:[TOKEN:[[id number]],SCHEDULEDAPPT:[06/22/2020],SCHEDULEDAPPTTIME:[09:30 AM]],PROVIDER:[TOKEN:[[id number]],SCHEDULEDAPPT:[06/18/2020],SCHEDULEDAPPTTIME:[02:45 AM]],PROVIDER:[TOKEN:[[id number]]]

## 2020-06-12 NOTE — DISCHARGE NOTE PROVIDER - INSTRUCTIONS
DASH Diet:  1. Grains: 6-8 servings per day. Examples: Whole grains/pasta, brown rice.  2. Fruits and Vegetables: 4-5 servings per day each.  3. Dairy: 2-3 servings per day. Examples: Low-fat or fat free yogurt, low-fat or skim milk or cheeses.   4. Lean Meat (Fish & Poultry): No more than 6oz. in a day. Avoid Red Meat.   5. Nuts/seeds: 4-5 servings per WEEK. Examples: Almonds, sunflower seeds, lentils. Avoid salted nuts.   6. Fats/oils: 2-3 servings per day. Examples: Olive oil, fat-free low-sodium spreads. Avoid fried foods, lard or butter.  7. Sweets: Sparingly, less than 2-3 servings per week.    For More Information, visit: https://www.healthline.com/nutrition/dash-diet

## 2020-06-12 NOTE — PROGRESS NOTE ADULT - SUBJECTIVE AND OBJECTIVE BOX
CTICU  CRITICAL  CARE  attending     Hand off received 					   Pertinent clinical, laboratory, radiographic, hemodynamic, echocardiographic, respiratory data, microbiologic data and chart were reviewed and analyzed frequently throughout the course of the day and night  Patient seen and examined with CTS/ SH attending at bedside    Pt is a 84y , Male, post op day # 1 s/p TF-TAVR;    today:    stable  TTE done;  no pericardial effusion          , FAMILY HISTORY:  Family history of cerebral hemorrhage (Father): Father  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  ICU Vital Signs Last 24 Hrs  T(C): 36.4 (12 Jun 2020 12:00), Max: 36.7 (11 Jun 2020 22:23)  T(F): 97.6 (12 Jun 2020 12:00), Max: 98 (11 Jun 2020 22:23)  HR: 83 (12 Jun 2020 13:00) (66 - 96)  BP: 135/74 (12 Jun 2020 13:00) (135/74 - 135/74)  BP(mean): 74 (12 Jun 2020 13:00) (74 - 74)  ABP: 135/65 (12 Jun 2020 13:00) (118/42 - 182/62)  ABP(mean): 74 (12 Jun 2020 13:00) (63 - 100)  RR: 17 (12 Jun 2020 13:00) (10 - 26)  SpO2: 99% (12 Jun 2020 13:00) (98% - 100%)    Adult Advanced Hemodynamics Last 24 Hrs  CVP(mm Hg): --  CVP(cm H2O): --  CO: --  CI: --  PA: --  PA(mean): --  PCWP: --  SVR: --  SVRI: --  PVR: --  PVRI: --, ABG - ( 12 Jun 2020 02:34 )  pH, Arterial: 7.41  pH, Blood: x     /  pCO2: 39    /  pO2: 111   / HCO3: 24    / Base Excess: -0.4  /  SaO2: 98                  Intake and output was reviewed and the fluid balance was calculated  Daily     Daily   I&O's Summary    11 Jun 2020 07:01  -  12 Jun 2020 07:00  --------------------------------------------------------  IN: 677.5 mL / OUT: 1600 mL / NET: -922.5 mL    12 Jun 2020 07:01  -  12 Jun 2020 13:39  --------------------------------------------------------  IN: 170 mL / OUT: 840 mL / NET: -670 mL        All lines and drain sites were assessed  [facility]      POCT Blood Glucose.: 108 mg/dL (12 Jun 2020 06:58)    No acute change in mental status  Auscultation of the chest reveals equal bs  Abdomen is soft  Extremities are warm and well perfused  Wounds appear clean and unremarkable  Antibiotics are periop    labs  CBC Full  -  ( 12 Jun 2020 02:37 )  WBC Count : 10.97 K/uL  RBC Count : 3.95 M/uL  Hemoglobin : 10.4 g/dL  Hematocrit : 31.8 %  Platelet Count - Automated : 262 K/uL  Mean Cell Volume : 80.5 fl  Mean Cell Hemoglobin : 26.3 pg  Mean Cell Hemoglobin Concentration : 32.7 gm/dL  Auto Neutrophil # : x  Auto Lymphocyte # : x  Auto Monocyte # : x  Auto Eosinophil # : x  Auto Basophil # : x  Auto Neutrophil % : x  Auto Lymphocyte % : x  Auto Monocyte % : x  Auto Eosinophil % : x  Auto Basophil % : x    06-12    137  |  107  |  17  ----------------------------<  112<H>  4.2   |  23  |  1.25    Ca    8.7      12 Jun 2020 02:37  Phos  2.9     06-12  Mg     2.3     06-12    TPro  6.5  /  Alb  2.9<L>  /  TBili  0.3  /  DBili  x   /  AST  22  /  ALT  14  /  AlkPhos  85  06-12    PT/INR - ( 12 Jun 2020 02:37 )   PT: 13.8 sec;   INR: 1.20          PTT - ( 12 Jun 2020 02:37 )  PTT:33.2 sec  The current medications were reviewed   MEDICATIONS  (STANDING):  acetaminophen  IVPB .. 1000 milliGRAM(s) IV Intermittent once  ascorbic acid 500 milliGRAM(s) Oral every 12 hours  aspirin enteric coated 81 milliGRAM(s) Oral daily  brimonidine 0.2% Ophthalmic Solution 1 Drop(s) Both EYES two times a day  carvedilol 3.125 milliGRAM(s) Oral every 12 hours  ceFAZolin   IVPB 2000 milliGRAM(s) IV Intermittent every 8 hours  chlorhexidine 2% Cloths 1 Application(s) Topical <User Schedule>  clopidogrel Tablet 75 milliGRAM(s) Oral daily  cyanocobalamin 1000 MICROGram(s) Oral daily  dextrose 5%. 1000 milliLiter(s) (50 mL/Hr) IV Continuous <Continuous>  dorzolamide 2% Ophthalmic Solution 1 Drop(s) Both EYES <User Schedule>  heparin   Injectable 7500 Unit(s) SubCutaneous every 8 hours  insulin lispro (HumaLOG) corrective regimen sliding scale   SubCutaneous Before meals and at bedtime  losartan 50 milliGRAM(s) Oral daily  pantoprazole    Tablet 40 milliGRAM(s) Oral before breakfast  polyethylene glycol 3350 17 Gram(s) Oral daily  senna 2 Tablet(s) Oral at bedtime  sodium chloride 0.9%. 1000 milliLiter(s) (10 mL/Hr) IV Continuous <Continuous>    MEDICATIONS  (PRN):  acetaminophen   Tablet .. 650 milliGRAM(s) Oral every 6 hours PRN Temp greater or equal to 38C (100.4F), Mild Pain (1 - 3)  dextrose 40% Gel 15 Gram(s) Oral once PRN Blood Glucose LESS THAN 70 milliGRAM(s)/deciliter  glucagon  Injectable 1 milliGRAM(s) IntraMuscular once PRN Glucose LESS THAN 70 milligrams/deciliter       PROBLEM LIST/ ASSESSMENT:  HEALTH ISSUES - PROBLEM Dx:      ,   Patient is a 84y old  Male who presents with a chief complaint of severe AS (12 Jun 2020 13:24)     s/p TF-TAVR      My plan includes :  close hemodynamic, ventilatory and drain monitoring and management per post op routine    Monitor for arrhythmias and monitor parameters for organ perfusion  monitor neurologic status  Head of the bed should remain elevated to 45 deg .   chest PT and IS will be encouraged  monitor adequacy of oxygenation and ventilation and attempt to wean oxygen  Nutritional goals will be met using po eventually , ensure adequate caloric intake and montior the same  Stress ulcer and VTE prophylaxis will be achieved    Glycemic control is satisfactory  Electrolytes have been repleted as necessary and wound care has been carried out. Pain control has been achieved.   agressive physical therapy and early mobility and ambulation goals will be met   The family was updated about the course and plan  CRITICAL CARE TIME SPENT in evaluation and management, reassessments, review and interpretation of labs and x-rays, ventilator and hemodynamic management, formulating a plan and coordinating care: __25___ MIN.  Time does not include procedural time.  CTICU ATTENDING     					    Srinivas Stephens MD

## 2020-06-12 NOTE — DISCHARGE NOTE PROVIDER - NSDCCPCAREPLAN_GEN_ALL_CORE_FT
PRINCIPAL DISCHARGE DIAGNOSIS  Diagnosis: Aortic stenosis, severe  Assessment and Plan of Treatment: It is very important that you continue your medications as prescribed after you are discharged.  You should refer to the medication reconciliation form provided to you on your discharge to information as to what medications to take and when.    Additionally, your medications have been sent to [facility] at [facility] and can be picked up as you are leaving the hospital.  The pharmacy is located next to the admitting office on the first floor.  If you have any issues obtaining your medications on the day of your discharge, please call [phone] for further assistance.   Additionally, you are being discharged home with a monitor called an MCOT which helps to monitor your heart rate and rhythm and reports it to Dr. Arana's office.  Please take time to read the instructions included in your MCOT kit.  If you have any questions regarding its use, please contact Dr. Arana's office at [phone].      SECONDARY DISCHARGE DIAGNOSES  Diagnosis: Hypertension  Assessment and Plan of Treatment: You were noted to have elevated blood pressure while you were in the hospital.  Before you went home, we resumed your home blood pressure medications which you tolerated well.  You should check your blood pressure at home at least once daily and track it in a journal.   You should follow-up with your cardiologist within 2 weeks of discharge to further discuss your hospital stay and medications.    Diagnosis: Thoracic spine fracture  Assessment and Plan of Treatment: You were admitted with a thoracic spine fracture recently and are being followed by Dr. Chaparro.  Per his team, you should wear your back brace for at least 6 weeks while you are out of bed.    You will need to contact Dr. Chaparro's office after your discharge to schedule a follow-up appointment.

## 2020-06-12 NOTE — DISCHARGE NOTE NURSING/CASE MANAGEMENT/SOCIAL WORK - NSDCFUADDAPPT_GEN_ALL_CORE_FT
Dr. Arana's office will contact you prior to your Telehealth appointment on 6/22/20 at 9:30am for further instructions.      Dr. Moody will follow-up with you on 6/18/20 at 2:45pm.  Her office information is [address] [place] [address]. Phone: [phone]     Dr. Chaparro's office (neurosurgery) will contact you with your follow-up information, date and time.

## 2020-06-12 NOTE — PROGRESS NOTE ADULT - SUBJECTIVE AND OBJECTIVE BOX
Patient discussed on morning rounds with Dr. Arana    Operation / Date: TF TAVR (Temitope) on 6/11/20    Surgeon: Dr. Arana    Referring Physician: Dr. Moody    SUBJECTIVE ASSESSMENT:  84y Male seen at bedside this AM.  Feels well, no acute complaints at this time.  Denies HA, AMS, CP, palpitations, SOB, cough, hemoptysis, n/v/d, fever. Feels ready to go home today.     Hospital Course:  This is a 84 year old male former smoker with a history of HTN, HLD, NIDDM), chronic diastolic CHF with EF 60% with known moderate to severe AS who initially presented to his cardiologist with NYHA Class III symptoms, WHEAT after ~2-3 city blocks and ~1-2 flights of stairs.  He complete pre-procedure evaluation and was deemed candidate for TAVR. On 6/11/20, he was admitted to [facility] where he underwent TF TAVR (Temitope).  Intraoperatively, he developed worsening BBB and arrived extubated to CTICU in stable condition on no gtt with femoral TVP in place.  QRS narrowed in CTICU and TVP was ultimately removed on POD 0 with stable serial EKG's and hemodynamics overnight.  On POD 1, ECHO performed which demonstrated EF >75% with hyperdynamic LV, Temitope valve in position with no AR, MG 13.10mmHg.  No pericardial effusion.  Findings reviewed with Dr. Arana, he was evaluated in AM and medically cleared for discharge to home with MCOT with plan for outpatient follow-up.     Vital Signs Last 24 Hrs  T(C): 36.4 (12 Jun 2020 12:00), Max: 36.7 (11 Jun 2020 22:23)  T(F): 97.6 (12 Jun 2020 12:00), Max: 98 (11 Jun 2020 22:23)  HR: 83 (12 Jun 2020 13:00) (66 - 96)  BP: 135/74 (12 Jun 2020 13:00) (135/74 - 135/74)  BP(mean): 74 (12 Jun 2020 13:00) (74 - 74)  RR: 17 (12 Jun 2020 13:00) (10 - 26)  SpO2: 99% (12 Jun 2020 13:00) (98% - 100%)  I&O's Detail    11 Jun 2020 07:01  -  12 Jun 2020 07:00  --------------------------------------------------------  IN:    IV PiggyBack: 150 mL    niCARdipine Infusion: 157.5 mL    Oral Fluid: 300 mL    sodium chloride 0.9%.: 70 mL  Total IN: 677.5 mL    OUT:    Voided: 1600 mL  Total OUT: 1600 mL    Total NET: -922.5 mL      12 Jun 2020 07:01  -  12 Jun 2020 13:24  --------------------------------------------------------  IN:    Oral Fluid: 170 mL  Total IN: 170 mL    OUT:    Voided: 840 mL  Total OUT: 840 mL    Total NET: -670 mL    EPICARDIAL WIRES REMOVED: NA.  TIE DOWNS REMOVED: Yes.    PHYSICAL EXAM:  General: OOB to chair, no acute distress.   Neurological: AAOx3, no AMS or focal deficits.   Cardiovascular: RRR, S1/S2, no m/r/g.   Respiratory: No acute distress, CTA b/l, no w/r/r.   Gastrointestinal: ND, NBS, non-TTP.  Extremities: Warm and well perfused, no calf ttp b/l.  No edema b/l.   Vascular: Pulses 2+ throughout.   Incision sites: B/l groins: C/D/I, no hematoma.     LABS:                        10.4   10.97 )-----------( 262      ( 12 Jun 2020 02:37 )             31.8       COUMADIN:  No    PT/INR - ( 12 Jun 2020 02:37 )   PT: 13.8 sec;   INR: 1.20          PTT - ( 12 Jun 2020 02:37 )  PTT:33.2 sec    06-12    137  |  107  |  17  ----------------------------<  112<H>  4.2   |  23  |  1.25    Ca    8.7      12 Jun 2020 02:37  Phos  2.9     06-12  Mg     2.3     06-12    TPro  6.5  /  Alb  2.9<L>  /  TBili  0.3  /  DBili  x   /  AST  22  /  ALT  14  /  AlkPhos  85  06-12    Discharge Medications	acetaminophen 325 mg oral tablet: 1 tab(s) orally every 6 hours, As Needed -Temp greater or equal to 38C (100.4F), Mild Pain (1 - 3) MDD:4 tabs acetaminophen-oxyCODONE 325 mg-5 mg oral tablet: 1 tab(s) orally every 6 hours, As Needed -Mild Pain MDD:4 tabs Alphagan 0.1% ophthalmic solution:  to each affected eye 2 times a day ascorbic acid 500 mg oral tablet: 1 tab(s) orally 2 times a day carvedilol 6.25 mg oral tablet: 1 tab(s) orally once a day clopidogrel 75 mg oral tablet: 1 tab(s) orally once a day docusate sodium 100 mg oral capsule: 1 cap(s) orally 3 times a day dorzolamide ophthalmic 2% ophthalmic solution:  to each affected eye 2 times a day Ecotrin Adult Low Strength: 81 milligram(s) orally once a day Feosol 325 mg (65 mg elemental iron) oral tablet: 1 tab(s) orally 3 times a day folic acid 1 mg oral tablet: 1 tab(s) orally once a day glipiZIDE 5 mg oral tablet: 1 tab(s) orally once a day irbesartan 300 mg oral tablet: 1 tab(s) orally once a day Lipitor 20 mg oral tablet: 1 tab(s) orally once a day  metFORMIN 500 mg oral tablet: 1 tab(s) orally 2 times a day pantoprazole 40 mg oral delayed release tablet: 1 tab(s) orally once a day (before a meal) senna oral tablet: 2 tab(s) orally once a day (at bedtime) Vitamin B12 1000 mcg oral tablet: 1 tab(s) orally once a day Vitamin D3 1000 intl units oral capsule: 1 cap(s) orally once a day	      Discharge CXR:  < from: Xray Chest 1 View-PORTABLE IMMEDIATE (06.11.20 @ 10:11) >    EXAM:  XR CHEST PORTABLE IMMED 1V                          PROCEDURE DATE:  06/11/2020          INTERPRETATION:    Chest x-ray    Indication: Postoperative patient    Prior studies: None    Single AP view of the chest is submitted. Limited study due to shallow inspiration. Apparent cardiomegaly. No pulmonary consolidation is seen. No pleural effusion or pneumothorax.    Impression: No acute pathology.    < end of copied text >    Discharge ECHO:  < from: TTE Echo Complete w/o Contrast w/ Doppler (06.12.20 @ 10:33) >  CONCLUSIONS:     1. Mild symmetric left ventricular hypertrophy.   2. Hyperdynamic left ventricular systolic function with cavity obliteration resulting in an intra-cavitary gradient of 27.00 mmHg. Left ventricular ejection fraction is >75%   3. Grade I left ventricular diastolic dysfunction.   4. Normal right ventricular size and systolic function.   5. 29 mm Temitope 3 valve is noted in the aortic position without any aortic regurgitation. The peak transvalvular velocity is 2.44 m/s, the mean transvalvular gradient is 13.10 mmHg, and the LVOT/AV velocity ratio is 0.54. The peak transaortic gradient is 23.81 mmHg.   6. No evidence of pulmonary hypertension.   7. No pericardial effusion.    < end of copied text >

## 2020-06-12 NOTE — PROGRESS NOTE ADULT - ASSESSMENT
Discharge Diagnoses, Assessment and Plan of Treatment	PRINCIPAL DISCHARGE DIAGNOSIS Diagnosis: Aortic stenosis, severe Assessment and Plan of Treatment: It is very important that you continue your medications as prescribed after you are discharged.  You should refer to the medication reconciliation form provided to you on your discharge to information as to what medications to take and when.   Additionally, your medications have been sent to [facility] at [facility] and can be picked up as you are leaving the hospital.  The pharmacy is located next to the admitting office on the first floor.  If you have any issues obtaining your medications on the day of your discharge, please call [phone] for further assistance.  Additionally, you are being discharged home with a monitor called an MCOT which helps to monitor your heart rate and rhythm and reports it to Dr. Arana's office.  Please take time to read the instructions included in your MCOT kit.  If you have any questions regarding its use, please contact Dr. Arana's office at [phone].   SECONDARY DISCHARGE DIAGNOSES Diagnosis: Hypertension Assessment and Plan of Treatment: You were noted to have elevated blood pressure while you were in the hospital.  Before you went home, we resumed your home blood pressure medications which you tolerated well.  You should check your blood pressure at home at least once daily and track it in a journal.  You should follow-up with your cardiologist within 2 weeks of discharge to further discuss your hospital stay and medications.  Diagnosis: Thoracic spine fracture Assessment and Plan of Treatment: You were admitted with a thoracic spine fracture recently and are being followed by Dr. Chaparro.  Per his team, you should wear your back brace for at least 6 weeks while you are out of bed.   You will need to contact Dr. Chaparro's office after your discharge to schedule a follow-up appointment.	     Follow Up:  Care Providers for Follow up (PCP/Outpatient Provider)	Matthew Arana CARDIOVASCULAR SURGERY [address] [place] [address] Phone: [phone] Fax: [phone] Scheduled Appointment: 06/22/2020 09:30 AM  Erin Moody CARDIOVASCULAR DISEASE [address] [place] [address] Phone: [phone] Fax: [phone] Scheduled Appointment: 06/18/2020 02:45 AM  Kirk Chaparro NEUROSURGERY [address] [address] Phone: [phone] Fax: [phone] Follow Up Time:	  Patient's Scheduled Appointments	SHAHRAM LÓPEZ ; 06/22/2020 ; NPP CT Surg [address] [place] [address]	  		  Additional Scheduled Appointments	Dr. Arana's office will contact you prior to your Telehealth appointment on 6/22/20 at 9:30am for further instructions.    Dr. Moody will follow-up with you on 6/18/20 at 2:45pm.  Her office information is [address] [place] [address]. Phone: [phone]   Dr. Chaparro's office (neurosurgery) will contact you with your follow-up information, date and time.

## 2020-06-12 NOTE — PHYSICAL THERAPY INITIAL EVALUATION ADULT - PERTINENT HX OF CURRENT PROBLEM, REHAB EVAL
84y , Male, post op day # 1 s/p TF-TAVR;83 y/o male former smoker with a hx of HTN, HLD, DM (on oral meds), chronic diastolic CHF with known moderate to severe AS who was referred for his valvular disease. Over the past 6 months, patient has been experiencing worsening WHEAT after walking a few blocks or a flight of stairs (Class III NYHA), along with increase in fatigue.

## 2020-06-12 NOTE — PHYSICAL THERAPY INITIAL EVALUATION ADULT - ADDITIONAL COMMENTS
Patient lives with spouse in private house with 6-8 GURVINDER. Does not use any Assistive Devices at baseline.

## 2020-06-12 NOTE — PHYSICAL THERAPY INITIAL EVALUATION ADULT - GENERAL OBSERVATIONS, REHAB EVAL
Patient received seated in supine in bed in no apparent distress on  RA, +ICU Telemetry, +Heplock,, +SCDs. Cleared by ABEL Colin. Agreeable to PT.

## 2020-06-12 NOTE — DISCHARGE NOTE PROVIDER - CARE PROVIDERS DIRECT ADDRESSES
,DirectAddress_Unknown,[email or web address]

## 2020-06-12 NOTE — DISCHARGE NOTE NURSING/CASE MANAGEMENT/SOCIAL WORK - PATIENT PORTAL LINK FT
You can access the FollowMyHealth Patient Portal offered by [facility] by registering at the following website: [email or web address]. By joining [employer]’s FollowMyHealth portal, you will also be able to view your health information using other applications (apps) compatible with our system.

## 2020-06-12 NOTE — PHYSICAL THERAPY INITIAL EVALUATION ADULT - PLANNED THERAPY INTERVENTIONS, PT EVAL
transfer training/strengthening/balance training/bed mobility training/gait training/postural re-education

## 2020-06-12 NOTE — DISCHARGE NOTE PROVIDER - HOSPITAL COURSE
This is a 84 year old male former smoker with a history of HTN, HLD, NIDDM), chronic diastolic CHF with EF 60% with known moderate to severe AS who initially presented to his cardiologist with NYHA Class III symptoms, WHEAT after ~2-3 city blocks and ~1-2 flights of stairs.  He complete pre-procedure evaluation and was deemed candidate for TAVR. On 6/11/20, he was admitted to [facility] where he underwent TF TAVR (Temitope).  Intraoperatively, he developed worsening BBB and arrived extubated to CTICU in stable condition on no gtt with femoral TVP in place.  QRS narrowed in CTICU and TVP was ultimately removed on POD 0 with stable serial EKG's and hemodynamics overnight.  On POD 1, ECHO performed which demonstrated [...].  Findings reviewed with Dr. Arana, he was evaluated in AM and medically cleared for discharge to home with OT with plan for outpatient follow-up. This is a 84 year old male former smoker with a history of HTN, HLD, NIDDM), chronic diastolic CHF with EF 60% with known moderate to severe AS who initially presented to his cardiologist with NYHA Class III symptoms, WHEAT after ~2-3 city blocks and ~1-2 flights of stairs.  He complete pre-procedure evaluation and was deemed candidate for TAVR. On 6/11/20, he was admitted to [facility] where he underwent TF TAVR (Temitope).  Intraoperatively, he developed worsening BBB and arrived extubated to CTICU in stable condition on no gtt with femoral TVP in place.  QRS narrowed in CTICU and TVP was ultimately removed on POD 0 with stable serial EKG's and hemodynamics overnight.  On POD 1, ECHO performed which demonstrated EF >75% with hyperdynamic LV, Temitope valve in position with no AR, MG 13.10mmHg.  No pericardial effusion.  Findings reviewed with Dr. Arana, he was evaluated in AM and medically cleared for discharge to home with OT with plan for outpatient follow-up.

## 2020-06-12 NOTE — DISCHARGE NOTE PROVIDER - CARE PROVIDER_API CALL
Matthew Arana  CARDIOVASCULAR SURGERY  [address] [place] [address]  Phone: [phone]  Fax: [phone]  Follow Up Time:     Erin Moody  CARDIOVASCULAR DISEASE  [address] [place] [address]  Phone: [phone]  Fax: [phone]  Follow Up Time:     Kirk Chaparro  NEUROSURGERY  [address] [address]  Phone: [phone]  Fax: [phone]  Follow Up Time: Matthew Arana  CARDIOVASCULAR SURGERY  [address] [place] [address]  Phone: [phone]  Fax: [phone]  Scheduled Appointment: 06/22/2020 09:30 AM    Erin Moody  CARDIOVASCULAR DISEASE  [address] [place] [address]  Phone: [phone]  Fax: [phone]  Scheduled Appointment: 06/18/2020 02:45 AM    Kirk Chaparro  NEUROSURGERY  [address] [address]  Phone: [phone]  Fax: [phone]  Follow Up Time:

## 2020-06-13 ENCOUNTER — TRANSCRIPTION ENCOUNTER (OUTPATIENT)
Age: 85
End: 2020-06-13

## 2020-06-16 DIAGNOSIS — Z79.82 LONG TERM (CURRENT) USE OF ASPIRIN: ICD-10-CM

## 2020-06-16 DIAGNOSIS — Z98.41 CATARACT EXTRACTION STATUS, RIGHT EYE: ICD-10-CM

## 2020-06-16 DIAGNOSIS — Z96.652 PRESENCE OF LEFT ARTIFICIAL KNEE JOINT: ICD-10-CM

## 2020-06-16 DIAGNOSIS — Z00.6 ENCOUNTER FOR EXAMINATION FOR NORMAL COMPARISON AND CONTROL IN CLINICAL RESEARCH PROGRAM: ICD-10-CM

## 2020-06-16 DIAGNOSIS — E11.22 TYPE 2 DIABETES MELLITUS WITH DIABETIC CHRONIC KIDNEY DISEASE: ICD-10-CM

## 2020-06-16 DIAGNOSIS — Z98.42 CATARACT EXTRACTION STATUS, LEFT EYE: ICD-10-CM

## 2020-06-16 DIAGNOSIS — Z79.84 LONG TERM (CURRENT) USE OF ORAL HYPOGLYCEMIC DRUGS: ICD-10-CM
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
## 2020-06-16 DIAGNOSIS — S22.089A UNSPECIFIED FRACTURE OF T11-T12 VERTEBRA, INITIAL ENCOUNTER FOR CLOSED FRACTURE: ICD-10-CM

## 2020-06-16 DIAGNOSIS — Z87.891 PERSONAL HISTORY OF NICOTINE DEPENDENCE: ICD-10-CM

## 2020-06-16 DIAGNOSIS — I13.0 HYPERTENSIVE HEART AND CHRONIC KIDNEY DISEASE WITH HEART FAILURE AND STAGE 1 THROUGH STAGE 4 CHRONIC KIDNEY DISEASE, OR UNSPECIFIED CHRONIC KIDNEY DISEASE: ICD-10-CM

## 2020-06-16 DIAGNOSIS — N18.9 CHRONIC KIDNEY DISEASE, UNSPECIFIED: ICD-10-CM

## 2020-06-16 DIAGNOSIS — I35.0 NONRHEUMATIC AORTIC (VALVE) STENOSIS: ICD-10-CM

## 2020-06-16 DIAGNOSIS — I50.32 CHRONIC DIASTOLIC (CONGESTIVE) HEART FAILURE: ICD-10-CM

## 2020-06-16 DIAGNOSIS — E66.9 OBESITY, UNSPECIFIED: ICD-10-CM
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
## 2020-06-16 DIAGNOSIS — E78.5 HYPERLIPIDEMIA, UNSPECIFIED: ICD-10-CM

## 2020-06-22 ENCOUNTER — APPOINTMENT (OUTPATIENT)
Dept: CARDIOTHORACIC SURGERY | Facility: CLINIC | Age: 85
End: 2020-06-22
Payer: MEDICARE

## 2020-06-22 PROCEDURE — 99442: CPT

## 2020-06-23 RX ORDER — CLOPIDOGREL 75 MG/1
75 TABLET, FILM COATED ORAL DAILY
Qty: 30 | Refills: 1 | Status: ACTIVE | COMMUNITY

## 2020-06-29 ENCOUNTER — APPOINTMENT (OUTPATIENT)
Dept: CARDIOTHORACIC SURGERY | Facility: CLINIC | Age: 85
End: 2020-06-29
Payer: MEDICARE

## 2020-06-29 PROCEDURE — 99213 OFFICE O/P EST LOW 20 MIN: CPT | Mod: 95

## 2020-06-29 RX ORDER — AMLODIPINE BESYLATE 5 MG/1
5 TABLET ORAL DAILY
Qty: 90 | Refills: 1 | Status: ACTIVE | COMMUNITY

## 2020-07-14 ENCOUNTER — TRANSCRIPTION ENCOUNTER (OUTPATIENT)
Age: 85
End: 2020-07-14

## 2020-07-19 ENCOUNTER — FORM ENCOUNTER (OUTPATIENT)
Age: 85
End: 2020-07-19

## 2020-07-20 ENCOUNTER — OUTPATIENT (OUTPATIENT)
Dept: OUTPATIENT SERVICES | Facility: HOSPITAL | Age: 85
LOS: 1 days | End: 2020-07-20
Payer: MEDICARE

## 2020-07-20 ENCOUNTER — APPOINTMENT (OUTPATIENT)
Dept: CARDIOTHORACIC SURGERY | Facility: CLINIC | Age: 85
End: 2020-07-20
Payer: MEDICARE

## 2020-07-20 VITALS
HEART RATE: 113 BPM | OXYGEN SATURATION: 97 % | TEMPERATURE: 95.9 F | WEIGHT: 246 LBS | SYSTOLIC BLOOD PRESSURE: 142 MMHG | HEIGHT: 69 IN | BODY MASS INDEX: 36.43 KG/M2 | RESPIRATION RATE: 18 BRPM | DIASTOLIC BLOOD PRESSURE: 67 MMHG

## 2020-07-20 DIAGNOSIS — I35.0 NONRHEUMATIC AORTIC (VALVE) STENOSIS: ICD-10-CM

## 2020-07-20 LAB
ALBUMIN SERPL ELPH-MCNC: 3.9 G/DL — SIGNIFICANT CHANGE UP (ref 3.3–5)
ALP SERPL-CCNC: 143 U/L — HIGH (ref 40–120)
ALT FLD-CCNC: 8 U/L — LOW (ref 10–45)
ANION GAP SERPL CALC-SCNC: 12 MMOL/L — SIGNIFICANT CHANGE UP (ref 5–17)
AST SERPL-CCNC: 16 U/L — SIGNIFICANT CHANGE UP (ref 10–40)
BASOPHILS # BLD AUTO: 0.02 K/UL — SIGNIFICANT CHANGE UP (ref 0–0.2)
BASOPHILS NFR BLD AUTO: 0.4 % — SIGNIFICANT CHANGE UP (ref 0–2)
BILIRUB SERPL-MCNC: 0.4 MG/DL — SIGNIFICANT CHANGE UP (ref 0.2–1.2)
BUN SERPL-MCNC: 13 MG/DL — SIGNIFICANT CHANGE UP (ref 7–23)
CALCIUM SERPL-MCNC: 9.6 MG/DL — SIGNIFICANT CHANGE UP (ref 8.4–10.5)
CHLORIDE SERPL-SCNC: 109 MMOL/L — HIGH (ref 96–108)
CO2 SERPL-SCNC: 23 MMOL/L — SIGNIFICANT CHANGE UP (ref 22–31)
CREAT SERPL-MCNC: 1.25 MG/DL — SIGNIFICANT CHANGE UP (ref 0.5–1.3)
EOSINOPHIL # BLD AUTO: 0.08 K/UL — SIGNIFICANT CHANGE UP (ref 0–0.5)
EOSINOPHIL NFR BLD AUTO: 1.6 % — SIGNIFICANT CHANGE UP (ref 0–6)
GLUCOSE SERPL-MCNC: 126 MG/DL — HIGH (ref 70–99)
HCT VFR BLD CALC: 33.8 % — LOW (ref 39–50)
HGB BLD-MCNC: 10.9 G/DL — LOW (ref 13–17)
IMM GRANULOCYTES NFR BLD AUTO: 0.4 % — SIGNIFICANT CHANGE UP (ref 0–1.5)
LYMPHOCYTES # BLD AUTO: 1.2 K/UL — SIGNIFICANT CHANGE UP (ref 1–3.3)
LYMPHOCYTES # BLD AUTO: 23.4 % — SIGNIFICANT CHANGE UP (ref 13–44)
MCHC RBC-ENTMCNC: 26.7 PG — LOW (ref 27–34)
MCHC RBC-ENTMCNC: 32.2 GM/DL — SIGNIFICANT CHANGE UP (ref 32–36)
MCV RBC AUTO: 82.8 FL — SIGNIFICANT CHANGE UP (ref 80–100)
MONOCYTES # BLD AUTO: 0.57 K/UL — SIGNIFICANT CHANGE UP (ref 0–0.9)
MONOCYTES NFR BLD AUTO: 11.1 % — SIGNIFICANT CHANGE UP (ref 2–14)
NEUTROPHILS # BLD AUTO: 3.23 K/UL — SIGNIFICANT CHANGE UP (ref 1.8–7.4)
NEUTROPHILS NFR BLD AUTO: 63.1 % — SIGNIFICANT CHANGE UP (ref 43–77)
NRBC # BLD: 0 /100 WBCS — SIGNIFICANT CHANGE UP (ref 0–0)
PLATELET # BLD AUTO: 198 K/UL — SIGNIFICANT CHANGE UP (ref 150–400)
POTASSIUM SERPL-MCNC: 3.6 MMOL/L — SIGNIFICANT CHANGE UP (ref 3.5–5.3)
POTASSIUM SERPL-SCNC: 3.6 MMOL/L — SIGNIFICANT CHANGE UP (ref 3.5–5.3)
PROT SERPL-MCNC: 7.2 G/DL — SIGNIFICANT CHANGE UP (ref 6–8.3)
RBC # BLD: 4.08 M/UL — LOW (ref 4.2–5.8)
RBC # FLD: 15.4 % — HIGH (ref 10.3–14.5)
SODIUM SERPL-SCNC: 144 MMOL/L — SIGNIFICANT CHANGE UP (ref 135–145)
WBC # BLD: 5.12 K/UL — SIGNIFICANT CHANGE UP (ref 3.8–10.5)
WBC # FLD AUTO: 5.12 K/UL — SIGNIFICANT CHANGE UP (ref 3.8–10.5)

## 2020-07-20 PROCEDURE — 76376 3D RENDER W/INTRP POSTPROCES: CPT | Mod: 26

## 2020-07-20 PROCEDURE — 80053 COMPREHEN METABOLIC PANEL: CPT

## 2020-07-20 PROCEDURE — 36415 COLL VENOUS BLD VENIPUNCTURE: CPT

## 2020-07-20 PROCEDURE — 99214 OFFICE O/P EST MOD 30 MIN: CPT | Mod: 25

## 2020-07-20 PROCEDURE — 93005 ELECTROCARDIOGRAM TRACING: CPT

## 2020-07-20 PROCEDURE — 93010 ELECTROCARDIOGRAM REPORT: CPT

## 2020-07-20 PROCEDURE — 85025 COMPLETE CBC W/AUTO DIFF WBC: CPT

## 2020-07-20 PROCEDURE — 93306 TTE W/DOPPLER COMPLETE: CPT | Mod: 26

## 2020-07-20 PROCEDURE — 93306 TTE W/DOPPLER COMPLETE: CPT

## 2020-07-21 RX ORDER — METOPROLOL TARTRATE 25 MG/1
25 TABLET, FILM COATED ORAL TWICE DAILY
Qty: 15 | Refills: 0 | Status: ACTIVE | COMMUNITY
Start: 2020-07-21 | End: 1900-01-01

## 2020-07-21 RX ORDER — POTASSIUM CHLORIDE 1500 MG/1
20 TABLET, FILM COATED, EXTENDED RELEASE ORAL
Qty: 30 | Refills: 1 | Status: ACTIVE | COMMUNITY
Start: 2020-07-21 | End: 1900-01-01

## 2020-07-23 NOTE — PHYSICAL EXAM
[General Appearance - In No Acute Distress] : no acute distress [Normal Conjunctiva] : the conjunctiva exhibited no abnormalities [Normal Oral Mucosa] : normal oral mucosa [Normal Jugular Venous A Waves Present] : normal jugular venous A waves present [Normal Jugular Venous V Waves Present] : normal jugular venous V waves present [Respiration, Rhythm And Depth] : normal respiratory rhythm and effort [Exaggerated Use Of Accessory Muscles For Inspiration] : no accessory muscle use [Heart Rate And Rhythm] : heart rate and rhythm were normal [Edema] : no peripheral edema present [Oriented To Time, Place, And Person] : oriented to person, place, and time [Abnormal Walk] : normal gait [Cyanosis, Localized] : no localized cyanosis [] : no rash [FreeTextEntry1] : + soft ejection murmur

## 2020-07-23 NOTE — HISTORY OF PRESENT ILLNESS
[FreeTextEntry1] : 84 year old former smoking male with a history of hypertension, hyperlipidemia, DM (on oral medications), chronic diastolic heart failure with severe aortic stenosis s/p TF TAVR on 06/11/2020 using S3 (29 mm, commercial) who presents for one month follow up \par \par Since his last Telehealth, the patient continues to feel well. He denies any SOB at rest or exertion, chest pain, palpitations, syncope, or LE edema. The patient is currently taking Norvasc 5 mg, however still held his BB. The patient states his SBP at home is 120s-130s, however HR in 90s.  The patient continues to wear his back brace and has not been able to get in touch with his neurosurgeon. 
Stable

## 2020-07-23 NOTE — REVIEW OF SYSTEMS
[see HPI] : see HPI [Shortness Of Breath] : shortness of breath [Negative] : Heme/Lymph [Dyspnea on exertion] : not dyspnea during exertion [Feeling Fatigued] : not feeling fatigued

## 2020-08-18 ENCOUNTER — OUTPATIENT (OUTPATIENT)
Dept: OUTPATIENT SERVICES | Facility: HOSPITAL | Age: 85
LOS: 1 days | End: 2020-08-18
Payer: MEDICARE

## 2020-08-18 ENCOUNTER — APPOINTMENT (OUTPATIENT)
Dept: CT IMAGING | Facility: HOSPITAL | Age: 85
End: 2020-08-18
Payer: MEDICARE

## 2020-08-18 DIAGNOSIS — S22.009A UNSPECIFIED FRACTURE OF UNSPECIFIED THORACIC VERTEBRA, INITIAL ENCOUNTER FOR CLOSED FRACTURE: ICD-10-CM

## 2020-08-18 PROCEDURE — 72128 CT CHEST SPINE W/O DYE: CPT | Mod: 26

## 2020-08-18 PROCEDURE — 72128 CT CHEST SPINE W/O DYE: CPT

## 2020-08-19 ENCOUNTER — APPOINTMENT (OUTPATIENT)
Dept: SPINE | Facility: CLINIC | Age: 85
End: 2020-08-19

## 2020-08-19 ENCOUNTER — APPOINTMENT (OUTPATIENT)
Dept: SPINE | Facility: CLINIC | Age: 85
End: 2020-08-19
Payer: MEDICARE

## 2020-08-19 DIAGNOSIS — S22.088A OTHER FRACTURE OF T11-T12 VERTEBRA, INITIAL ENCOUNTER FOR CLOSED FRACTURE: ICD-10-CM

## 2020-08-19 DIAGNOSIS — S22.009A UNSPECIFIED FRACTURE OF UNSPECIFIED THORACIC VERTEBRA, INITIAL ENCOUNTER FOR CLOSED FRACTURE: ICD-10-CM

## 2020-08-19 PROCEDURE — 99213 OFFICE O/P EST LOW 20 MIN: CPT | Mod: 95

## 2020-08-20 PROBLEM — S22.009A FRACTURE, THORACIC VERTEBRA: Status: ACTIVE | Noted: 2020-06-04

## 2020-08-20 PROBLEM — S22.088A: Status: ACTIVE | Noted: 2020-06-04

## 2021-06-23 NOTE — CONSULT NOTE ADULT - CONSULT REASON
Render Risk Assessment In Note?: yes Comment: Hx\\nBCC on nasal tip status post MOHS and paramedian forehead flap. Detail Level: Zone T12 fracture
